# Patient Record
Sex: FEMALE | Race: OTHER | URBAN - METROPOLITAN AREA
[De-identification: names, ages, dates, MRNs, and addresses within clinical notes are randomized per-mention and may not be internally consistent; named-entity substitution may affect disease eponyms.]

---

## 2018-04-24 ENCOUNTER — INPATIENT (INPATIENT)
Facility: HOSPITAL | Age: 83
LOS: 7 days | Discharge: NURSING, REHAB OR LONG-TERM CARE | End: 2018-05-02
Attending: INTERNAL MEDICINE | Admitting: INTERNAL MEDICINE
Payer: MEDICARE

## 2018-04-24 VITALS
SYSTOLIC BLOOD PRESSURE: 107 MMHG | RESPIRATION RATE: 16 BRPM | DIASTOLIC BLOOD PRESSURE: 51 MMHG | OXYGEN SATURATION: 96 % | HEART RATE: 104 BPM | TEMPERATURE: 102 F

## 2018-04-24 DIAGNOSIS — A41.9 SEPSIS, UNSPECIFIED ORGANISM: ICD-10-CM

## 2018-04-24 LAB
ALBUMIN SERPL ELPH-MCNC: 3.5 G/DL — SIGNIFICANT CHANGE UP (ref 3.3–5)
ALP SERPL-CCNC: 61 U/L — SIGNIFICANT CHANGE UP (ref 40–120)
ALT FLD-CCNC: 18 U/L — SIGNIFICANT CHANGE UP (ref 4–33)
ANISOCYTOSIS BLD QL: SLIGHT — SIGNIFICANT CHANGE UP
AST SERPL-CCNC: 27 U/L — SIGNIFICANT CHANGE UP (ref 4–32)
B PERT DNA SPEC QL NAA+PROBE: SIGNIFICANT CHANGE UP
BASE EXCESS BLDV CALC-SCNC: -1.3 MMOL/L — SIGNIFICANT CHANGE UP
BASOPHILS # BLD AUTO: 0.05 K/UL — SIGNIFICANT CHANGE UP (ref 0–0.2)
BASOPHILS NFR BLD AUTO: 0.8 % — SIGNIFICANT CHANGE UP (ref 0–2)
BASOPHILS NFR SPEC: 0 % — SIGNIFICANT CHANGE UP (ref 0–2)
BILIRUB SERPL-MCNC: 0.4 MG/DL — SIGNIFICANT CHANGE UP (ref 0.2–1.2)
BLASTS # FLD: 0 % — SIGNIFICANT CHANGE UP (ref 0–0)
BLOOD GAS VENOUS - CREATININE: 1.53 MG/DL — HIGH (ref 0.5–1.3)
BUN SERPL-MCNC: 36 MG/DL — HIGH (ref 7–23)
C PNEUM DNA SPEC QL NAA+PROBE: NOT DETECTED — SIGNIFICANT CHANGE UP
CALCIUM SERPL-MCNC: 8.2 MG/DL — LOW (ref 8.4–10.5)
CHLORIDE BLDV-SCNC: 109 MMOL/L — HIGH (ref 96–108)
CHLORIDE SERPL-SCNC: 106 MMOL/L — SIGNIFICANT CHANGE UP (ref 98–107)
CO2 SERPL-SCNC: 21 MMOL/L — LOW (ref 22–31)
CREAT SERPL-MCNC: 1.59 MG/DL — HIGH (ref 0.5–1.3)
EOSINOPHIL # BLD AUTO: 0 K/UL — SIGNIFICANT CHANGE UP (ref 0–0.5)
EOSINOPHIL NFR BLD AUTO: 0 % — SIGNIFICANT CHANGE UP (ref 0–6)
EOSINOPHIL NFR FLD: 0 % — SIGNIFICANT CHANGE UP (ref 0–6)
FLUAV H1 2009 PAND RNA SPEC QL NAA+PROBE: NOT DETECTED — SIGNIFICANT CHANGE UP
FLUAV H1 RNA SPEC QL NAA+PROBE: NOT DETECTED — SIGNIFICANT CHANGE UP
FLUAV H3 RNA SPEC QL NAA+PROBE: NOT DETECTED — SIGNIFICANT CHANGE UP
FLUAV SUBTYP SPEC NAA+PROBE: SIGNIFICANT CHANGE UP
FLUBV RNA SPEC QL NAA+PROBE: NOT DETECTED — SIGNIFICANT CHANGE UP
GAS PNL BLDV: 139 MMOL/L — SIGNIFICANT CHANGE UP (ref 136–146)
GIANT PLATELETS BLD QL SMEAR: PRESENT — SIGNIFICANT CHANGE UP
GLUCOSE BLDV-MCNC: 121 — HIGH (ref 70–99)
GLUCOSE SERPL-MCNC: 127 MG/DL — HIGH (ref 70–99)
HADV DNA SPEC QL NAA+PROBE: NOT DETECTED — SIGNIFICANT CHANGE UP
HCO3 BLDV-SCNC: 23 MMOL/L — SIGNIFICANT CHANGE UP (ref 20–27)
HCOV 229E RNA SPEC QL NAA+PROBE: NOT DETECTED — SIGNIFICANT CHANGE UP
HCOV HKU1 RNA SPEC QL NAA+PROBE: NOT DETECTED — SIGNIFICANT CHANGE UP
HCOV NL63 RNA SPEC QL NAA+PROBE: NOT DETECTED — SIGNIFICANT CHANGE UP
HCOV OC43 RNA SPEC QL NAA+PROBE: NOT DETECTED — SIGNIFICANT CHANGE UP
HCT VFR BLD CALC: 28.2 % — LOW (ref 34.5–45)
HCT VFR BLDV CALC: 27.5 % — LOW (ref 34.5–45)
HGB BLD-MCNC: 8.8 G/DL — LOW (ref 11.5–15.5)
HGB BLDV-MCNC: 8.9 G/DL — LOW (ref 11.5–15.5)
HMPV RNA SPEC QL NAA+PROBE: POSITIVE — HIGH
HPIV1 RNA SPEC QL NAA+PROBE: NOT DETECTED — SIGNIFICANT CHANGE UP
HPIV2 RNA SPEC QL NAA+PROBE: NOT DETECTED — SIGNIFICANT CHANGE UP
HPIV3 RNA SPEC QL NAA+PROBE: NOT DETECTED — SIGNIFICANT CHANGE UP
HPIV4 RNA SPEC QL NAA+PROBE: NOT DETECTED — SIGNIFICANT CHANGE UP
HYPOCHROMIA BLD QL: SLIGHT — SIGNIFICANT CHANGE UP
IMM GRANULOCYTES # BLD AUTO: 0.13 # — SIGNIFICANT CHANGE UP
IMM GRANULOCYTES NFR BLD AUTO: 2.1 % — HIGH (ref 0–1.5)
LACTATE BLDV-MCNC: 1.2 MMOL/L — SIGNIFICANT CHANGE UP (ref 0.5–2)
LYMPHOCYTES # BLD AUTO: 0.45 K/UL — LOW (ref 1–3.3)
LYMPHOCYTES # BLD AUTO: 7.2 % — LOW (ref 13–44)
LYMPHOCYTES NFR SPEC AUTO: 1.7 % — LOW (ref 13–44)
M PNEUMO DNA SPEC QL NAA+PROBE: NOT DETECTED — SIGNIFICANT CHANGE UP
MACROCYTES BLD QL: SLIGHT — SIGNIFICANT CHANGE UP
MCHC RBC-ENTMCNC: 31.2 % — LOW (ref 32–36)
MCHC RBC-ENTMCNC: 32 PG — SIGNIFICANT CHANGE UP (ref 27–34)
MCV RBC AUTO: 102.5 FL — HIGH (ref 80–100)
METAMYELOCYTES # FLD: 0 % — SIGNIFICANT CHANGE UP (ref 0–1)
MONOCYTES # BLD AUTO: 0.18 K/UL — SIGNIFICANT CHANGE UP (ref 0–0.9)
MONOCYTES NFR BLD AUTO: 2.9 % — SIGNIFICANT CHANGE UP (ref 2–14)
MONOCYTES NFR BLD: 0.9 % — LOW (ref 2–9)
MYELOCYTES NFR BLD: 0 % — SIGNIFICANT CHANGE UP (ref 0–0)
NEUTROPHIL AB SER-ACNC: 89.4 % — HIGH (ref 43–77)
NEUTROPHILS # BLD AUTO: 5.41 K/UL — SIGNIFICANT CHANGE UP (ref 1.8–7.4)
NEUTROPHILS NFR BLD AUTO: 87 % — HIGH (ref 43–77)
NEUTS BAND # BLD: 6.2 % — HIGH (ref 0–6)
NRBC # FLD: 0 — SIGNIFICANT CHANGE UP
OTHER - HEMATOLOGY %: 0 — SIGNIFICANT CHANGE UP
OVALOCYTES BLD QL SMEAR: SLIGHT — SIGNIFICANT CHANGE UP
PCO2 BLDV: 37 MMHG — LOW (ref 41–51)
PH BLDV: 7.41 PH — SIGNIFICANT CHANGE UP (ref 7.32–7.43)
PLATELET # BLD AUTO: 100 K/UL — LOW (ref 150–400)
PLATELET COUNT - ESTIMATE: SIGNIFICANT CHANGE UP
PMV BLD: 11.7 FL — SIGNIFICANT CHANGE UP (ref 7–13)
PO2 BLDV: 39 MMHG — SIGNIFICANT CHANGE UP (ref 35–40)
POTASSIUM BLDV-SCNC: 3.9 MMOL/L — SIGNIFICANT CHANGE UP (ref 3.4–4.5)
POTASSIUM SERPL-MCNC: 4.2 MMOL/L — SIGNIFICANT CHANGE UP (ref 3.5–5.3)
POTASSIUM SERPL-SCNC: 4.2 MMOL/L — SIGNIFICANT CHANGE UP (ref 3.5–5.3)
PROMYELOCYTES # FLD: 0 % — SIGNIFICANT CHANGE UP (ref 0–0)
PROT SERPL-MCNC: 6.6 G/DL — SIGNIFICANT CHANGE UP (ref 6–8.3)
RBC # BLD: 2.75 M/UL — LOW (ref 3.8–5.2)
RBC # FLD: 15.9 % — HIGH (ref 10.3–14.5)
RSV RNA SPEC QL NAA+PROBE: NOT DETECTED — SIGNIFICANT CHANGE UP
RV+EV RNA SPEC QL NAA+PROBE: NOT DETECTED — SIGNIFICANT CHANGE UP
SAO2 % BLDV: 68.4 % — SIGNIFICANT CHANGE UP (ref 60–85)
SODIUM SERPL-SCNC: 142 MMOL/L — SIGNIFICANT CHANGE UP (ref 135–145)
VARIANT LYMPHS # BLD: 1.8 % — SIGNIFICANT CHANGE UP
WBC # BLD: 6.22 K/UL — SIGNIFICANT CHANGE UP (ref 3.8–10.5)
WBC # FLD AUTO: 6.22 K/UL — SIGNIFICANT CHANGE UP (ref 3.8–10.5)

## 2018-04-24 PROCEDURE — 71045 X-RAY EXAM CHEST 1 VIEW: CPT | Mod: 26

## 2018-04-24 RX ORDER — IPRATROPIUM/ALBUTEROL SULFATE 18-103MCG
3 AEROSOL WITH ADAPTER (GRAM) INHALATION ONCE
Qty: 0 | Refills: 0 | Status: COMPLETED | OUTPATIENT
Start: 2018-04-24 | End: 2018-04-24

## 2018-04-24 RX ORDER — VANCOMYCIN HCL 1 G
1000 VIAL (EA) INTRAVENOUS ONCE
Qty: 0 | Refills: 0 | Status: COMPLETED | OUTPATIENT
Start: 2018-04-24 | End: 2018-04-24

## 2018-04-24 RX ORDER — SODIUM CHLORIDE 9 MG/ML
1000 INJECTION INTRAMUSCULAR; INTRAVENOUS; SUBCUTANEOUS ONCE
Qty: 0 | Refills: 0 | Status: COMPLETED | OUTPATIENT
Start: 2018-04-24 | End: 2018-04-24

## 2018-04-24 RX ORDER — PIPERACILLIN AND TAZOBACTAM 4; .5 G/20ML; G/20ML
3.38 INJECTION, POWDER, LYOPHILIZED, FOR SOLUTION INTRAVENOUS ONCE
Qty: 0 | Refills: 0 | Status: COMPLETED | OUTPATIENT
Start: 2018-04-24 | End: 2018-04-24

## 2018-04-24 RX ORDER — SODIUM CHLORIDE 9 MG/ML
1000 INJECTION INTRAMUSCULAR; INTRAVENOUS; SUBCUTANEOUS
Qty: 0 | Refills: 0 | Status: DISCONTINUED | OUTPATIENT
Start: 2018-04-24 | End: 2018-04-25

## 2018-04-24 RX ADMIN — Medication 250 MILLIGRAM(S): at 20:59

## 2018-04-24 RX ADMIN — SODIUM CHLORIDE 150 MILLILITER(S): 9 INJECTION INTRAMUSCULAR; INTRAVENOUS; SUBCUTANEOUS at 21:57

## 2018-04-24 RX ADMIN — Medication 3 MILLILITER(S): at 21:03

## 2018-04-24 RX ADMIN — PIPERACILLIN AND TAZOBACTAM 200 GRAM(S): 4; .5 INJECTION, POWDER, LYOPHILIZED, FOR SOLUTION INTRAVENOUS at 19:52

## 2018-04-24 RX ADMIN — SODIUM CHLORIDE 2000 MILLILITER(S): 9 INJECTION INTRAMUSCULAR; INTRAVENOUS; SUBCUTANEOUS at 18:57

## 2018-04-24 NOTE — ED PROVIDER NOTE - ATTENDING CONTRIBUTION TO CARE
neel: pmh as noted. several days of increasing weakness, cough. Unable to tolerate po antibiotics that were prescribed and pt came to ED.  exam: febrile via axillary temperature. pt alert, conversant, cooperative. NAD. Bilateral rhonchi, rt>lt.  cxr and labs pending. antibiotics and fluids started for presumed sepsis/possible pneumonia.

## 2018-04-24 NOTE — ED PROVIDER NOTE - PMH
Breast Cancer  s/l s/l lumpectomy 2003 radiation  CAD (Coronary Artery Disease)  coronarary stent 1 2010  Constipation    Corneal foreign body  s/p corneal transplant right  High cholesterol    HTN (Hypertension)  2000  Lung nodule  right 2004  Osteoporosis

## 2018-04-24 NOTE — ED ADULT NURSE NOTE - OBJECTIVE STATEMENT
Received pt to spot 24 A&Ox1 on assessment family at the bedside reports pt is A&Ox4 @ baseline. As per family pt has been coughing x several days and has subjective fever @ home up to 102. Pt currently on IV chemo r/t recurrent breast Ca. Family noted to be poor historians. Pt denies CP or other subjective medical complaints, appears otherwise comfortable, mediport accessed, VS as documented, will continue to monitor.

## 2018-04-24 NOTE — ED PROVIDER NOTE - OBJECTIVE STATEMENT
88 y/o female pmh htn, CAD, lung CA currently on chemo, last dose x4 days ago c/o cough and fever x4 days. pt admits to productive cough with whitish sputum. Pt went to PMD x4 days ago who prescribed cough suppressant and losengers. Pt then developed worsening cough and sob and weakness. Pt had fever today and went to PMD again who gave pt IV fluids in office and prescribed abx. As per son, pt could not swallow abx and was having fever and rigors at home, which warranted ER visit. Pt also admits to 1 episode of emesis x1 day ago, nbnb. Pt denies chest pain, abd pain, diarrhea, dysuria, frequency, numbness, tingling, dizziness or syncope. 88 y/o female pmh htn, CAD, lung CA currently on chemo, last dose x4 days ago c/o cough and fever x4 days. pt admits to productive cough with whitish sputum. Pt went to PMD x4 days ago who prescribed cough suppressant and lozengers. Pt then developed worsening cough and sob and weakness. Pt had fever today and went to PMD again who gave pt IV fluids in office and prescribed abx. As per son, pt could not swallow abx and was having fever and rigors at home, which warranted ER visit. Pt also admits to 1 episode of emesis x1 day ago, nbnb. Pt denies chest pain, abd pain, diarrhea, dysuria, frequency, numbness, tingling, dizziness or syncope.

## 2018-04-24 NOTE — ED ADULT TRIAGE NOTE - CHIEF COMPLAINT QUOTE
Pt brought in by EMS from home complaining of weakness, was seen by her PMD this AM and given antibiotics and sent home. As per EMS pt felt worse when she got home and called 911.

## 2018-04-25 DIAGNOSIS — I25.10 ATHEROSCLEROTIC HEART DISEASE OF NATIVE CORONARY ARTERY WITHOUT ANGINA PECTORIS: ICD-10-CM

## 2018-04-25 DIAGNOSIS — A41.9 SEPSIS, UNSPECIFIED ORGANISM: ICD-10-CM

## 2018-04-25 DIAGNOSIS — E78.00 PURE HYPERCHOLESTEROLEMIA, UNSPECIFIED: ICD-10-CM

## 2018-04-25 DIAGNOSIS — I10 ESSENTIAL (PRIMARY) HYPERTENSION: ICD-10-CM

## 2018-04-25 DIAGNOSIS — Z29.9 ENCOUNTER FOR PROPHYLACTIC MEASURES, UNSPECIFIED: ICD-10-CM

## 2018-04-25 DIAGNOSIS — R91.1 SOLITARY PULMONARY NODULE: ICD-10-CM

## 2018-04-25 LAB
BASOPHILS # BLD AUTO: 0.06 K/UL — SIGNIFICANT CHANGE UP (ref 0–0.2)
BASOPHILS NFR BLD AUTO: 0.8 % — SIGNIFICANT CHANGE UP (ref 0–2)
BUN SERPL-MCNC: 28 MG/DL — HIGH (ref 7–23)
CALCIUM SERPL-MCNC: 7.4 MG/DL — LOW (ref 8.4–10.5)
CHLORIDE SERPL-SCNC: 107 MMOL/L — SIGNIFICANT CHANGE UP (ref 98–107)
CO2 SERPL-SCNC: 17 MMOL/L — LOW (ref 22–31)
CREAT SERPL-MCNC: 1.6 MG/DL — HIGH (ref 0.5–1.3)
EOSINOPHIL # BLD AUTO: 0 K/UL — SIGNIFICANT CHANGE UP (ref 0–0.5)
EOSINOPHIL NFR BLD AUTO: 0 % — SIGNIFICANT CHANGE UP (ref 0–6)
GLUCOSE SERPL-MCNC: 124 MG/DL — HIGH (ref 70–99)
HCT VFR BLD CALC: 29 % — LOW (ref 34.5–45)
HGB BLD-MCNC: 8.4 G/DL — LOW (ref 11.5–15.5)
IMM GRANULOCYTES # BLD AUTO: 0.05 # — SIGNIFICANT CHANGE UP
IMM GRANULOCYTES NFR BLD AUTO: 0.6 % — SIGNIFICANT CHANGE UP (ref 0–1.5)
LYMPHOCYTES # BLD AUTO: 1.21 K/UL — SIGNIFICANT CHANGE UP (ref 1–3.3)
LYMPHOCYTES # BLD AUTO: 15.3 % — SIGNIFICANT CHANGE UP (ref 13–44)
MAGNESIUM SERPL-MCNC: 2.1 MG/DL — SIGNIFICANT CHANGE UP (ref 1.6–2.6)
MANUAL SMEAR VERIFICATION: SIGNIFICANT CHANGE UP
MCHC RBC-ENTMCNC: 29 % — LOW (ref 32–36)
MCHC RBC-ENTMCNC: 31.8 PG — SIGNIFICANT CHANGE UP (ref 27–34)
MCV RBC AUTO: 109.8 FL — HIGH (ref 80–100)
MONOCYTES # BLD AUTO: 0.14 K/UL — SIGNIFICANT CHANGE UP (ref 0–0.9)
MONOCYTES NFR BLD AUTO: 1.8 % — LOW (ref 2–14)
NEUTROPHILS # BLD AUTO: 6.43 K/UL — SIGNIFICANT CHANGE UP (ref 1.8–7.4)
NEUTROPHILS NFR BLD AUTO: 81.5 % — HIGH (ref 43–77)
NRBC # FLD: 0 — SIGNIFICANT CHANGE UP
PHOSPHATE SERPL-MCNC: 3.5 MG/DL — SIGNIFICANT CHANGE UP (ref 2.5–4.5)
PLATELET # BLD AUTO: 71 K/UL — LOW (ref 150–400)
PMV BLD: 12.1 FL — SIGNIFICANT CHANGE UP (ref 7–13)
POTASSIUM SERPL-MCNC: 3.8 MMOL/L — SIGNIFICANT CHANGE UP (ref 3.5–5.3)
POTASSIUM SERPL-SCNC: 3.8 MMOL/L — SIGNIFICANT CHANGE UP (ref 3.5–5.3)
RBC # BLD: 2.64 M/UL — LOW (ref 3.8–5.2)
RBC # FLD: 16.2 % — HIGH (ref 10.3–14.5)
SODIUM SERPL-SCNC: 140 MMOL/L — SIGNIFICANT CHANGE UP (ref 135–145)
SPECIMEN SOURCE: SIGNIFICANT CHANGE UP
SPECIMEN SOURCE: SIGNIFICANT CHANGE UP
WBC # BLD: 7.89 K/UL — SIGNIFICANT CHANGE UP (ref 3.8–10.5)
WBC # FLD AUTO: 7.89 K/UL — SIGNIFICANT CHANGE UP (ref 3.8–10.5)

## 2018-04-25 PROCEDURE — 99223 1ST HOSP IP/OBS HIGH 75: CPT

## 2018-04-25 RX ORDER — ACETAMINOPHEN 500 MG
650 TABLET ORAL EVERY 6 HOURS
Qty: 0 | Refills: 0 | Status: DISCONTINUED | OUTPATIENT
Start: 2018-04-25 | End: 2018-05-02

## 2018-04-25 RX ORDER — IPRATROPIUM/ALBUTEROL SULFATE 18-103MCG
3 AEROSOL WITH ADAPTER (GRAM) INHALATION EVERY 6 HOURS
Qty: 0 | Refills: 0 | Status: DISCONTINUED | OUTPATIENT
Start: 2018-04-25 | End: 2018-05-02

## 2018-04-25 RX ORDER — LOSARTAN POTASSIUM 100 MG/1
50 TABLET, FILM COATED ORAL DAILY
Qty: 0 | Refills: 0 | Status: DISCONTINUED | OUTPATIENT
Start: 2018-04-25 | End: 2018-04-26

## 2018-04-25 RX ORDER — ACETAMINOPHEN 500 MG
1000 TABLET ORAL ONCE
Qty: 0 | Refills: 0 | Status: COMPLETED | OUTPATIENT
Start: 2018-04-25 | End: 2018-04-25

## 2018-04-25 RX ORDER — METOPROLOL TARTRATE 50 MG
50 TABLET ORAL DAILY
Qty: 0 | Refills: 0 | Status: DISCONTINUED | OUTPATIENT
Start: 2018-04-25 | End: 2018-05-02

## 2018-04-25 RX ORDER — HEPARIN SODIUM 5000 [USP'U]/ML
5000 INJECTION INTRAVENOUS; SUBCUTANEOUS EVERY 8 HOURS
Qty: 0 | Refills: 0 | Status: DISCONTINUED | OUTPATIENT
Start: 2018-04-25 | End: 2018-04-25

## 2018-04-25 RX ORDER — ATORVASTATIN CALCIUM 80 MG/1
20 TABLET, FILM COATED ORAL AT BEDTIME
Qty: 0 | Refills: 0 | Status: DISCONTINUED | OUTPATIENT
Start: 2018-04-25 | End: 2018-05-02

## 2018-04-25 RX ORDER — SODIUM CHLORIDE 9 MG/ML
1000 INJECTION INTRAMUSCULAR; INTRAVENOUS; SUBCUTANEOUS
Qty: 0 | Refills: 0 | Status: DISCONTINUED | OUTPATIENT
Start: 2018-04-25 | End: 2018-04-26

## 2018-04-25 RX ORDER — AMLODIPINE BESYLATE 2.5 MG/1
5 TABLET ORAL DAILY
Qty: 0 | Refills: 0 | Status: DISCONTINUED | OUTPATIENT
Start: 2018-04-25 | End: 2018-04-28

## 2018-04-25 RX ORDER — BUDESONIDE, MICRONIZED 100 %
0.5 POWDER (GRAM) MISCELLANEOUS
Qty: 0 | Refills: 0 | Status: DISCONTINUED | OUTPATIENT
Start: 2018-04-25 | End: 2018-05-02

## 2018-04-25 RX ADMIN — Medication 3 MILLILITER(S): at 09:14

## 2018-04-25 RX ADMIN — Medication 3 MILLILITER(S): at 15:34

## 2018-04-25 RX ADMIN — HEPARIN SODIUM 5000 UNIT(S): 5000 INJECTION INTRAVENOUS; SUBCUTANEOUS at 05:26

## 2018-04-25 RX ADMIN — Medication 20 MILLIGRAM(S): at 22:17

## 2018-04-25 RX ADMIN — Medication 400 MILLIGRAM(S): at 01:44

## 2018-04-25 RX ADMIN — SODIUM CHLORIDE 75 MILLILITER(S): 9 INJECTION INTRAMUSCULAR; INTRAVENOUS; SUBCUTANEOUS at 03:54

## 2018-04-25 RX ADMIN — Medication 20 MILLIGRAM(S): at 13:25

## 2018-04-25 RX ADMIN — Medication 3 MILLILITER(S): at 22:07

## 2018-04-25 RX ADMIN — Medication 650 MILLIGRAM(S): at 21:44

## 2018-04-25 RX ADMIN — ATORVASTATIN CALCIUM 20 MILLIGRAM(S): 80 TABLET, FILM COATED ORAL at 22:17

## 2018-04-25 RX ADMIN — Medication 0.5 MILLIGRAM(S): at 21:57

## 2018-04-25 RX ADMIN — Medication 3 MILLILITER(S): at 04:29

## 2018-04-25 NOTE — H&P ADULT - HISTORY OF PRESENT ILLNESS
89 y.o. woman with history of essential hypertension, CAD, lung cancer and chemotherapy who was brought tot he ER for evaluation of worsening cough with generalized weakness of several days in duration. Patient is a poor historian, therefore, all pertinent information is obtained from chart review. As per ER records, patient was seen and evaluated by PMD on several occasions and was prescribed cough suppressants and PO lozenges without improvements. Patient was also given PO abx, however, she was unable to tolerate the antibiotics secondary to emesis. Patient was sent to the ER for further treatment was given vanc/zosyn in the ER. RVP showed HMPV and patient was admitted to the medicine service for further care. Presently, patient reports mild mid back pain. No other complaints.

## 2018-04-25 NOTE — H&P ADULT - PSH
Cataracts, bilateral    Coronary Stent  x 1 2000  S/P Breast Lumpectomy  L breast  S/P hysterectomy  1997

## 2018-04-25 NOTE — H&P ADULT - GASTROINTESTINAL DETAILS
no bruit/no guarding/no distention/soft/nontender/no masses palpable/no rebound tenderness/no organomegaly/no rigidity/bowel sounds normal

## 2018-04-25 NOTE — CONSULT NOTE ADULT - SUBJECTIVE AND OBJECTIVE BOX
[facility], Division of Infectious Diseases  CHRISTOPHER Mckinley A. Lee    TENA, VERONICA  89y, Female  [id number]    HPI--  History from chart, patient is confused and does not respond to questions/commands consistently. 89F with metastatic cancer on chemotherapy, variously described in the chart as either lung cancer or breast cancer (medianstinoscopy  with +LN, ER+; other biopsies prior bx with ?neuroendocrine features) on chemotherapy; Breast cancer s/p lumpectomy in , CAD/stent in , syncope, C. difficile colitis, dyslipidemia, hysterectomy, osteoporosis, and hypertension. She presents to ER with cough, congestion and failed PO antibiotics as outpatient. Here she's febrile >102F, tachycardic to 104bpm in ER. She was initially given broad spectrum antibiotics with vancomycina nd pip-tazo but these were not continued after RVP+ for HMPV. She's been afebrile since, off antibiotics.     PMH/PSH--  Osteoporosis  Constipation  Lung nodule  Corneal foreign body  High cholesterol  CAD (Coronary Artery Disease)  Breast Cancer  HTN (Hypertension)  S/P hysterectomy  Cataracts, bilateral  S/P Breast Lumpectomy  Coronary Stent      Allergies-- NKDA per chart      Medications--  Antibiotics:   Immunologic:   Other: acetaminophen   Tablet PRN  ALBUTerol/ipratropium for Nebulization  amLODIPine   Tablet  atorvastatin  buDESOnide   0.5 milliGRAM(s) Respule  losartan  methylPREDNISolone sodium succinate Injectable  metoprolol succinate ER  sodium chloride 0.9%.      Social History--  EtOH: none reported  Tobacco: none reported  Drug Use: none reported    Family/Marital History--  .  Father  from CHF  Mother  in her 90's of old age  No pertinent family history in first degree relatives  Family history of heart disease  Family history of cancer  Remainder unknown     Travel/Environmental/Occupational History:  unable    Review of Systems:  Review of systems unable secondary to clinical condition.     Physical Exam--  Vital Signs: T(F): 98.8 (18 @ 05:20), Max: 102.3 (18 @ 00:21)  HR: 90 (18 @ 09:14)  BP: 103/47 (18 @ 05:20)  RR: 16 (18 @ 05:20)  SpO2: 96% (18 @ 09:14)  Wt(kg): --  General: Nontoxic-appearing Female in no acute distress.  HEENT: AT/NC. Pupils/EOM unable secondary to patient compliance. Oropharynx/dentition unable secondary to patient compliance. Corneal opacity L eye, hx corneal xplant with foreign body  Neck: Not rigid. No sense of mass.  Chest: port R ACW C/D/I  Nodes: None palpable.  Lungs: wheezing/rhonchi bilaterally   Heart: Partially obscured. Grossly regular rate and rhythm. No Murmur. No rub. No gallop. No palpable thrill.  Abdomen: Bowel sounds present and normoactive. Soft. Nondistended. Nontender. No sense of mass. No organomegaly.  Back: No spinal tenderness. No costovertebral angle tenderness.   Extremities: No cyanosis or clubbing. No edema.   Skin: Warm. Dry. Good turgor. No rash. No vasculitic stigmata.  Neuro: Appears to be moving all extremities, no focal defecit. Unable to assess sensory exam.   Psychiatric: Confused. Does not respond to commands/questions consistently or appropriately.       Laboratory & Imaging Data--  CBC                        8.4    7.89  )-----------( 71       ( 2018 06:45 )             29.0   WBC Count: 6.22 K/uL (18 @ 18:15)    No coags    Chemistries      140  |  107  |  28<H>  ----------------------------<  124<H>  3.8   |  17<L>  |  1.60<H>  Creatinine, Serum: 1.59 mg/dL (18 @ 18:15)  Creatinine, Serum: 1.55 mg/dL (08.11.15 @ 10:19)      Ca    7.4<L>      2018 06:45  Phos  3.5       Mg     2.1         TPro  6.6  /  Alb  3.5  /  TBili  0.4  /  DBili  x   /  AST  27  /  ALT  18  /  AlkPhos  61      Rapid Respiratory Viral Panel (18 @ 19:00)    Adenovirus (RapRVP): NOT DETECTED    Influenza A (RapRVP): NOT DETECTED (any subtype)    Influenza AH1 2009 (RapRVP): NOT DETECTED    Influenza AH1 (RapRVP): NOT DETECTED    Influenza AH3 (RapRVP): NOT DETECTED    Influenza B (RapRVP): NOT DETECTED    Parainfluenza 1 (RapRVP): NOT DETECTED    Parainfluenza 2 (RapRVP): NOT DETECTED    Parainfluenza 3 (RapRVP): NOT DETECTED    Parainfluenza 4 (RapRVP): NOT DETECTED    Resp Syncytial Virus (RapRVP): NOT DETECTED    Bordetella pertussis (RapRVP): NOT DETECTED    Chlamydia pneumoniae (RapRVP): NOT DETECTED    Mycoplasma pneumoniae (RapRVP): NOT DETECTED     Entero/Rhinovirus (RapRVP): NOT DETECTED    HKU1 Coronavirus (RapRVP): NOT DETECTED    NL63 Coronavirus (RapRVP): NOT DETECTED    229E Coronavirus (RapRVP): NOT DETECTED    OC43 Coronavirus (RapRVP): NOT DETECTED    hMPV (RapRVP): POSITIVE      < from: Xray Chest 1 View AP/PA (18 @ 20:31) >    IMPRESSION:   No acute pulmonary disease.    < end of copied text >      Culture Data  None as of yet

## 2018-04-25 NOTE — H&P ADULT - PROBLEM SELECTOR PLAN 1
- secondary to hMPV  - Symptomatic treatment with inhaled steroids, duonebs, and supplemental O2  - Monitor off of abx for now

## 2018-04-25 NOTE — CHART NOTE - NSCHARTNOTEFT_GEN_A_CORE
Pt seen and examined at bedside. Please refer to the H&P done today for details.    no cp, no sob, no n/v/d. no abdominal pain.  no headache, no dizziness.   + expiratory wheeze bilaterally.   The son at bedside.     Still wheezing quite a bit. +cough. Fever 101F at home.  +hMPV  Will start Solumedrol 20 mg Q8hrs. c/w Nebs. O2 nasal canula PRN.   Monitor off abx. Pending culture.  ID Eval.   Cr is somewhat baseline.  Anemic, likely chemo induced. Monitor Hgb.   Check iron studies, Vit B12. Pt denied melena, hematochezia.     d/w the pt and the son at bedside.     - Dr. SRINIVAS Cardona ([facility])  - [phone]

## 2018-04-25 NOTE — CONSULT NOTE ADULT - ASSESSMENT
89F with probable metastatic breast cancer on unknown chemotherapy regimen. Not neutropenic. Anemic and thrombocytopenic. Doubt latter on the basis of sepsis, suspect chemo related. Drop may be dilutional.  Here with Bronchitis due to HMPV  Also with delirium. Doubt primary CNS infection causing symptoms. Mental status/speech more consistent with delirium more than dysarthria due CVA. Question whether 60mg/day of solumedrol might be negatively impacting her mental status. She is thrombocytopenic but no focal deficit observed on exam.  No concern of skin/soft tissue infection  No known urinary symptoms, but no urine studies appear to have been sent. Moreover available hx and chest exam fits with dx of HMPV  Abdominal exam benign  Port site benign, blood cx pending but given alternative source for presentation I do not feel obligated to give antibiotics vs. this possibility presently.  With her having a history of C. difficile, I'd have a high threshold to begin empiric antibiotics here.    Suggestions--  Contact precautions  Supportive care for HMPV. She does not appear uncomfortable presently. On steroids/nebs, decrease steroids as able  Delirum as per primary service.   Contact precautions per protocol.  Will review with Dr. Cardona.    Thank you for the courtesy of this referral.  Osmel Meadows MD  [phone]

## 2018-04-25 NOTE — H&P ADULT - ASSESSMENT
89 y.o. woman with essential hypertension, CAD, lung cancer on chemotherapy now cough, fever, and malaise likely secondary to hMPV infection.

## 2018-04-25 NOTE — PHYSICAL THERAPY INITIAL EVALUATION ADULT - ADDITIONAL COMMENTS
Patient reports she lives with her daughter in a home with steps to enter; patient did not use an Assistive Device prior to admission

## 2018-04-25 NOTE — PHYSICAL THERAPY INITIAL EVALUATION ADULT - PERTINENT HX OF CURRENT PROBLEM, REHAB EVAL
89 y.o. woman with history of essential hypertension, CAD, lung cancer and chemotherapy who was brought to the ER for evaluation of worsening cough with generalized weakness of several days in duration.

## 2018-04-25 NOTE — H&P ADULT - NSHPPHYSICALEXAM_GEN_ALL_CORE
Vital Signs Last 24 Hrs  T(C): 37.8 (25 Apr 2018 02:47), Max: 39.1 (25 Apr 2018 00:21)  T(F): 100 (25 Apr 2018 02:47), Max: 102.3 (25 Apr 2018 00:21)  HR: 98 (25 Apr 2018 02:47) (91 - 107)  BP: 99/48 (25 Apr 2018 02:47) (99/48 - 124/41)  BP(mean): --  RR: 16 (25 Apr 2018 02:47) (16 - 18)  SpO2: 98% (25 Apr 2018 02:47) (96% - 100%)

## 2018-04-26 DIAGNOSIS — C50.919 MALIGNANT NEOPLASM OF UNSPECIFIED SITE OF UNSPECIFIED FEMALE BREAST: ICD-10-CM

## 2018-04-26 DIAGNOSIS — N17.9 ACUTE KIDNEY FAILURE, UNSPECIFIED: ICD-10-CM

## 2018-04-26 DIAGNOSIS — D50.8 OTHER IRON DEFICIENCY ANEMIAS: ICD-10-CM

## 2018-04-26 DIAGNOSIS — J40 BRONCHITIS, NOT SPECIFIED AS ACUTE OR CHRONIC: ICD-10-CM

## 2018-04-26 DIAGNOSIS — B97.81 HUMAN METAPNEUMOVIRUS AS THE CAUSE OF DISEASES CLASSIFIED ELSEWHERE: ICD-10-CM

## 2018-04-26 LAB
APPEARANCE UR: SIGNIFICANT CHANGE UP
BILIRUB UR-MCNC: NEGATIVE — SIGNIFICANT CHANGE UP
BLOOD UR QL VISUAL: NEGATIVE — SIGNIFICANT CHANGE UP
BUN SERPL-MCNC: 29 MG/DL — HIGH (ref 7–23)
CALCIUM SERPL-MCNC: 8.2 MG/DL — LOW (ref 8.4–10.5)
CHLORIDE SERPL-SCNC: 110 MMOL/L — HIGH (ref 98–107)
CO2 SERPL-SCNC: 16 MMOL/L — LOW (ref 22–31)
COLOR SPEC: YELLOW — SIGNIFICANT CHANGE UP
CREAT SERPL-MCNC: 2.07 MG/DL — HIGH (ref 0.5–1.3)
FERRITIN SERPL-MCNC: 792.6 NG/ML — HIGH (ref 15–150)
GLUCOSE SERPL-MCNC: 190 MG/DL — HIGH (ref 70–99)
GLUCOSE UR-MCNC: NEGATIVE — SIGNIFICANT CHANGE UP
HCT VFR BLD CALC: 29.1 % — LOW (ref 34.5–45)
HGB BLD-MCNC: 8.9 G/DL — LOW (ref 11.5–15.5)
IRON SATN MFR SERPL: 12 UG/DL — LOW (ref 30–160)
IRON SATN MFR SERPL: 178 UG/DL — SIGNIFICANT CHANGE UP (ref 140–530)
KETONES UR-MCNC: NEGATIVE — SIGNIFICANT CHANGE UP
LEUKOCYTE ESTERASE UR-ACNC: NEGATIVE — SIGNIFICANT CHANGE UP
MAGNESIUM SERPL-MCNC: 2.2 MG/DL — SIGNIFICANT CHANGE UP (ref 1.6–2.6)
MCHC RBC-ENTMCNC: 30.6 % — LOW (ref 32–36)
MCHC RBC-ENTMCNC: 32.4 PG — SIGNIFICANT CHANGE UP (ref 27–34)
MCV RBC AUTO: 105.8 FL — HIGH (ref 80–100)
MUCOUS THREADS # UR AUTO: SIGNIFICANT CHANGE UP
NITRITE UR-MCNC: NEGATIVE — SIGNIFICANT CHANGE UP
NRBC # FLD: 0 — SIGNIFICANT CHANGE UP
PH UR: 6 — SIGNIFICANT CHANGE UP (ref 4.6–8)
PLATELET # BLD AUTO: 82 K/UL — LOW (ref 150–400)
PMV BLD: 11.7 FL — SIGNIFICANT CHANGE UP (ref 7–13)
POTASSIUM SERPL-MCNC: 3.7 MMOL/L — SIGNIFICANT CHANGE UP (ref 3.5–5.3)
POTASSIUM SERPL-SCNC: 3.7 MMOL/L — SIGNIFICANT CHANGE UP (ref 3.5–5.3)
PROT UR-MCNC: 100 MG/DL — HIGH
RBC # BLD: 2.75 M/UL — LOW (ref 3.8–5.2)
RBC # FLD: 16.5 % — HIGH (ref 10.3–14.5)
RBC CASTS # UR COMP ASSIST: SIGNIFICANT CHANGE UP (ref 0–?)
SODIUM SERPL-SCNC: 145 MMOL/L — SIGNIFICANT CHANGE UP (ref 135–145)
SP GR SPEC: 1.01 — SIGNIFICANT CHANGE UP (ref 1–1.04)
SQUAMOUS # UR AUTO: SIGNIFICANT CHANGE UP
UIBC SERPL-MCNC: 166.3 UG/DL — SIGNIFICANT CHANGE UP (ref 110–370)
UROBILINOGEN FLD QL: NORMAL MG/DL — SIGNIFICANT CHANGE UP
VIT B12 SERPL-MCNC: 1128 PG/ML — HIGH (ref 200–900)
WBC # BLD: 10.37 K/UL — SIGNIFICANT CHANGE UP (ref 3.8–10.5)
WBC # FLD AUTO: 10.37 K/UL — SIGNIFICANT CHANGE UP (ref 3.8–10.5)
WBC UR QL: SIGNIFICANT CHANGE UP (ref 0–?)

## 2018-04-26 PROCEDURE — 70450 CT HEAD/BRAIN W/O DYE: CPT | Mod: 26

## 2018-04-26 RX ORDER — SODIUM CHLORIDE 9 MG/ML
1000 INJECTION, SOLUTION INTRAVENOUS
Qty: 0 | Refills: 0 | Status: DISCONTINUED | OUTPATIENT
Start: 2018-04-26 | End: 2018-04-27

## 2018-04-26 RX ORDER — SODIUM CHLORIDE 9 MG/ML
1000 INJECTION INTRAMUSCULAR; INTRAVENOUS; SUBCUTANEOUS
Qty: 0 | Refills: 0 | Status: DISCONTINUED | OUTPATIENT
Start: 2018-04-26 | End: 2018-04-26

## 2018-04-26 RX ADMIN — Medication 20 MILLIGRAM(S): at 14:25

## 2018-04-26 RX ADMIN — Medication 20 MILLIGRAM(S): at 05:36

## 2018-04-26 RX ADMIN — Medication 3 MILLILITER(S): at 04:27

## 2018-04-26 RX ADMIN — ATORVASTATIN CALCIUM 20 MILLIGRAM(S): 80 TABLET, FILM COATED ORAL at 21:46

## 2018-04-26 RX ADMIN — Medication 0.5 MILLIGRAM(S): at 20:16

## 2018-04-26 RX ADMIN — Medication 3 MILLILITER(S): at 11:35

## 2018-04-26 RX ADMIN — Medication 3 MILLILITER(S): at 20:24

## 2018-04-26 RX ADMIN — SODIUM CHLORIDE 75 MILLILITER(S): 9 INJECTION, SOLUTION INTRAVENOUS at 22:49

## 2018-04-26 RX ADMIN — Medication 650 MILLIGRAM(S): at 22:49

## 2018-04-26 RX ADMIN — SODIUM CHLORIDE 75 MILLILITER(S): 9 INJECTION INTRAMUSCULAR; INTRAVENOUS; SUBCUTANEOUS at 11:19

## 2018-04-26 RX ADMIN — Medication 20 MILLIGRAM(S): at 21:46

## 2018-04-26 RX ADMIN — Medication 3 MILLILITER(S): at 16:19

## 2018-04-26 RX ADMIN — Medication 0.5 MILLIGRAM(S): at 11:36

## 2018-04-26 NOTE — PROGRESS NOTE ADULT - PROBLEM SELECTOR PLAN 1
- secondary to hMPV  - Symptomatic treatment with inhaled steroids, duonebs, and supplemental O2  - Monitor off of abx for now  - started on IV Solumedrol with improved wheezing. - secondary to hMPV  - Symptomatic treatment with inhaled steroids, duonebs, and supplemental O2  - Monitor off of abx for now  - started on IV Solumedrol with improved wheezing.  - CT chest pending. Pulm Dr. Franz consulted. - secondary to hMPV  - Symptomatic treatment with inhaled steroids, duonebs, and supplemental O2  - Monitor off of abx for now  - started on IV Solumedrol with improved wheezing.  - CT chest pending. Pulm Dr. Franz consulted.  - (Metabolic encephalopathy due to sepsis from HMPV). CT head is neg.

## 2018-04-27 DIAGNOSIS — N17.9 ACUTE KIDNEY FAILURE, UNSPECIFIED: ICD-10-CM

## 2018-04-27 DIAGNOSIS — D72.829 ELEVATED WHITE BLOOD CELL COUNT, UNSPECIFIED: ICD-10-CM

## 2018-04-27 LAB
BUN SERPL-MCNC: 39 MG/DL — HIGH (ref 7–23)
CALCIUM SERPL-MCNC: 8.5 MG/DL — SIGNIFICANT CHANGE UP (ref 8.4–10.5)
CHLORIDE SERPL-SCNC: 110 MMOL/L — HIGH (ref 98–107)
CO2 SERPL-SCNC: 19 MMOL/L — LOW (ref 22–31)
CREAT SERPL-MCNC: 2.31 MG/DL — HIGH (ref 0.5–1.3)
GLUCOSE SERPL-MCNC: 187 MG/DL — HIGH (ref 70–99)
HCT VFR BLD CALC: 27.7 % — LOW (ref 34.5–45)
HGB BLD-MCNC: 8.5 G/DL — LOW (ref 11.5–15.5)
LACTATE SERPL-SCNC: 1.4 MMOL/L — SIGNIFICANT CHANGE UP (ref 0.5–2)
MCHC RBC-ENTMCNC: 30.7 % — LOW (ref 32–36)
MCHC RBC-ENTMCNC: 31.6 PG — SIGNIFICANT CHANGE UP (ref 27–34)
MCV RBC AUTO: 103 FL — HIGH (ref 80–100)
NRBC # FLD: 0 — SIGNIFICANT CHANGE UP
PLATELET # BLD AUTO: 104 K/UL — LOW (ref 150–400)
PMV BLD: 12 FL — SIGNIFICANT CHANGE UP (ref 7–13)
POTASSIUM SERPL-MCNC: 3 MMOL/L — LOW (ref 3.5–5.3)
POTASSIUM SERPL-SCNC: 3 MMOL/L — LOW (ref 3.5–5.3)
RBC # BLD: 2.69 M/UL — LOW (ref 3.8–5.2)
RBC # FLD: 16.6 % — HIGH (ref 10.3–14.5)
SODIUM SERPL-SCNC: 146 MMOL/L — HIGH (ref 135–145)
SPECIMEN SOURCE: SIGNIFICANT CHANGE UP
SPECIMEN SOURCE: SIGNIFICANT CHANGE UP
WBC # BLD: 16.56 K/UL — HIGH (ref 3.8–10.5)
WBC # FLD AUTO: 16.56 K/UL — HIGH (ref 3.8–10.5)

## 2018-04-27 PROCEDURE — 71250 CT THORAX DX C-: CPT | Mod: 26

## 2018-04-27 PROCEDURE — 76775 US EXAM ABDO BACK WALL LIM: CPT | Mod: 26

## 2018-04-27 RX ORDER — POTASSIUM CHLORIDE 20 MEQ
20 PACKET (EA) ORAL
Qty: 0 | Refills: 0 | Status: COMPLETED | OUTPATIENT
Start: 2018-04-27 | End: 2018-04-27

## 2018-04-27 RX ADMIN — Medication 20 MILLIGRAM(S): at 05:28

## 2018-04-27 RX ADMIN — AMLODIPINE BESYLATE 5 MILLIGRAM(S): 2.5 TABLET ORAL at 08:25

## 2018-04-27 RX ADMIN — Medication 3 MILLILITER(S): at 22:42

## 2018-04-27 RX ADMIN — Medication 3 MILLILITER(S): at 16:30

## 2018-04-27 RX ADMIN — Medication 20 MILLIEQUIVALENT(S): at 13:15

## 2018-04-27 RX ADMIN — Medication 3 MILLILITER(S): at 11:16

## 2018-04-27 RX ADMIN — Medication 0.5 MILLIGRAM(S): at 11:16

## 2018-04-27 RX ADMIN — Medication 20 MILLIEQUIVALENT(S): at 10:55

## 2018-04-27 RX ADMIN — SODIUM CHLORIDE 75 MILLILITER(S): 9 INJECTION, SOLUTION INTRAVENOUS at 08:25

## 2018-04-27 RX ADMIN — Medication 20 MILLIGRAM(S): at 18:03

## 2018-04-27 RX ADMIN — Medication 50 MILLIGRAM(S): at 08:25

## 2018-04-27 RX ADMIN — Medication 20 MILLIEQUIVALENT(S): at 11:42

## 2018-04-27 RX ADMIN — Medication 0.5 MILLIGRAM(S): at 22:42

## 2018-04-27 RX ADMIN — Medication 3 MILLILITER(S): at 03:04

## 2018-04-27 RX ADMIN — ATORVASTATIN CALCIUM 20 MILLIGRAM(S): 80 TABLET, FILM COATED ORAL at 21:44

## 2018-04-27 NOTE — CONSULT NOTE ADULT - PROBLEM SELECTOR RECOMMENDATION 9
Rising cr, with unclear baseline renal function.   Renal US without hydronephrosis, with pt with urinary retention.  Straight cathed with 700ml urine drained.   Continue to recheck bladder US later today to reeval urinary retention. If urine volume remains high, pt may need alexander cath.  Pt previously on IVF, without much improvement in Cr.  Ok to monitor off IVF for now.   Stable hemodynamics and no identifiable nephrotoxins.   Check daily BMP, avoid ACE/ARB,NSAID,contrast

## 2018-04-27 NOTE — CONSULT NOTE ADULT - PROBLEM SELECTOR RECOMMENDATION 2
chest x-ray : nodule not seen on it ? hsx oflung cacner: getting chemo: will folow with oncology after dc

## 2018-04-27 NOTE — CONSULT NOTE ADULT - ASSESSMENT
89y Female with HTN, CAD s/p PCI, breast cancer on chemo a/e SOB and cough, found to have +RVP, managed with methylprednisolone/Nebs and off abx. Renal consult for POOJA.

## 2018-04-27 NOTE — CONSULT NOTE ADULT - SUBJECTIVE AND OBJECTIVE BOX
Patient is a 89y old  Female who presents with a chief complaint of cough and febrile episode of several days in duration (2018 02:57)      HPI:  89 y.o. woman with history of essential hypertension, CAD, lung cancer and chemotherapy who was brought tot he ER for evaluation of worsening cough with generalized weakness of several days in duration. Patient is a poor historian, therefore, all pertinent information is obtained from chart review. As per ER records, patient was seen and evaluated by PMD on several occasions and was prescribed cough suppressants and PO lozenges without improvements. Patient was also given PO abx, however, she was unable to tolerate the antibiotics secondary to emesis. Patient was sent to the ER for further treatment was given vanc/zosyn in the ER. RVP showed HMPV and patient was admitted to the medicine service for further care. Presently, patient reports mild mid back pain. No other complaints. (2018 02:57)    on my interview: I agree . pt is a pretty poor historian and keeps telling me she got angioplasty in : She denies having any pulmonry disorder and says she used to smoke: She is on chemo for lung cacne r    ?FOLLOWING PRESENT  [x ] Hx of PE/DVT, [ x] Hx COPD, [x] Hx of Asthma, [y ] Hx of Hospitalization, [ x]  Hx of BiPAP/CPAP use, [ x] Hx of JUAN    Allergies    No Known Allergies    Intolerances        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  High cholesterol  CAD (Coronary Artery Disease): coronarary stent 1   Breast Cancer: s/l s/l lumpectomy  radiation  HTN (Hypertension):   S/P hysterectomy:   Cataracts, bilateral  S/P Breast Lumpectomy: L breast  Coronary Stent: x 1       FAMILY HISTORY:  No pertinent family history in first degree relatives      Social History: [ ex tobacco smoker  ] TOBACCO                  [x  ] ETOH                                 [ x ] IVDA/DRUGS    REVIEW OF SYSTEMS      General:x	    Skin/Breast:x  	  Ophthalmologic:x  	  ENMT:	x    Respiratory and Thorax: sob, wheezing   	  Cardiovascular:	    Gastrointestinal:	x    Genitourinary:	x    Musculoskeletal:	x    Neurological:	x    Psychiatric:	x    Hematology/Lymphatics:	x    Endocrine:	x    Allergic/Immunologic:	x    MEDICATIONS  (STANDING):  ALBUTerol/ipratropium for Nebulization 3 milliLiter(s) Nebulizer every 6 hours  amLODIPine   Tablet 5 milliGRAM(s) Oral daily  atorvastatin 20 milliGRAM(s) Oral at bedtime  buDESOnide   0.5 milliGRAM(s) Respule 0.5 milliGRAM(s) Inhalation two times a day  methylPREDNISolone sodium succinate Injectable 20 milliGRAM(s) IV Push every 12 hours  metoprolol succinate ER 50 milliGRAM(s) Oral daily  potassium chloride    Tablet ER 20 milliEquivalent(s) Oral every 2 hours    MEDICATIONS  (PRN):  acetaminophen   Tablet 650 milliGRAM(s) Oral every 6 hours PRN For Temp greater than 38 C (100.4 F) or mild pain       Vital Signs Last 24 Hrs  T(C): 36.1 (2018 05:22), Max: 38.3 (2018 21:43)  T(F): 96.9 (2018 05:22), Max: 100.9 (2018 21:43)  HR: 87 (2018 11:19) (87 - 119)  BP: 118/57 (2018 08:24) (106/58 - 122/59)  BP(mean): --  RR: 18 (2018 05:22) (18 - 19)  SpO2: 96% (2018 11:19) (96% - 98%)        I&O's Summary    2018 07:01  -  2018 12:32  --------------------------------------------------------  IN: 0 mL / OUT: 750 mL / NET: -750 mL        Physical Exam:   GENERAL: looks ill   HEENT: LIN/   Atraumatic, Normocephalic  ENMT: No tonsillar erythema, exudates, or enlargement; Moist mucous membranes, Good dentition, No lesions  NECK: Supple, No JVD, Normal thyroid  CHEST/LUNG: mild wheezing   CVS: Regular rate and rhythm; No murmurs, rubs, or gallops  GI: : Soft, Nontender, Nondistended; Bowel sounds present  NERVOUS SYSTEM:  Alert & Oriented X2  EXTREMITIES:  2+ Peripheral Pulses, No clubbing, cyanosis, or edema  LYMPH: No lymphadenopathy noted  SKIN: No rashes or lesions  ENDOCRINOLOGY: No Thyromegaly  PSYCH: Appropriate    Labs:  -1.3<37<4>>39<<7.415>>-1.3<<3><<4><<5<<399>>                            8.5    16.56 )-----------( 104      ( 2018 07:09 )             27.7                         8.9    10.37 )-----------( 82       ( 2018 07:05 )             29.1                         8.4    7.89  )-----------( 71       ( 2018 06:45 )             29.0                         8.8    6.22  )-----------( 100      ( 2018 18:15 )             28.2     04    146<H>  |  110<H>  |  39<H>  ----------------------------<  187<H>  3.0<L>   |  19<L>  |  2.31<H>      145  |  110<H>  |  29<H>  ----------------------------<  190<H>  3.7   |  16<L>  |  2.07<H>      140  |  107  |  28<H>  ----------------------------<  124<H>  3.8   |  17<L>  |  1.60<H>      142  |  106  |  36<H>  ----------------------------<  127<H>  4.2   |  21<L>  |  1.59<H>    Ca    8.5      2018 07:09  Ca    8.2<L>      2018 05:10  Mg     2.2         TPro  6.6  /  Alb  3.5  /  TBili  0.4  /  DBili  x   /  AST  27  /  ALT  18  /  AlkPhos  61      CAPILLARY BLOOD GLUCOSE            Urinalysis Basic - ( 2018 09:20 )    Color: YELLOW / Appearance: HAZY / S.015 / pH: 6.0  Gluc: NEGATIVE / Ketone: NEGATIVE  / Bili: NEGATIVE / Urobili: NORMAL mg/dL   Blood: NEGATIVE / Protein: 100 mg/dL / Nitrite: NEGATIVE   Leuk Esterase: NEGATIVE / RBC: 0-2 / WBC 2-5   Sq Epi: OCC / Non Sq Epi: x / Bacteria: x      D DImerLactate, Blood: 1.4 mmol/L ( @ 07:09)      Cultures:           Wound culture:                 @ 09:56  Organism --  Culture w/ gram stain --  Specimen Source BLOOD    Wound culture:                 @ 19:07  Organism --  Culture w/ gram stain --  Specimen Source BLOOD PERIPHERAL      Abscess culture:              @ 09:56  Organism --  Gram Stain --  Specimen Source BLOOD    Abscess culture:              @ 19:07  Organism --  Gram Stain --  Specimen Source BLOOD PERIPHERAL        Tissue culture:            @ 09:56  Organism --  Gram Stain --  Specimen Source BLOOD    Tissue culture:            @ 19:07  Organism --  Gram Stain --  Specimen Source BLOOD PERIPHERAL      Body Fluid Smear & Culture:                         @ 09:56  AFB Smear  --  Culture Acid Fast Body Fluid w/ Smear  --  Culture Acid Fast Smear Concentrated   --    Culture Results:     --  Specimen Source BLOOD    Body Fluid Smear & Culture:                         @ 19:07  AFB Smear  --  Culture Acid Fast Body Fluid w/ Smear  --  Culture Acid Fast Smear Concentrated   --    Culture Results:     --  Specimen Source BLOOD PERIPHERAL        Rapid Respiratory Viral Panel Result         @ 19:00  Rapid RVP --  Coronovirus --  Adenovirus NOT DETECTED  Bordetella Pertussis NOT DETECTED  Chlamydia Pneumonia NOT DETECTED  Entero/RhinovirusNOT DETECTED  HKU1 Coronovirus --  HMPV Coronovirus POSITIVE  Influenza A NOT DETECTED (any subtype)  Influenza AH1 NOT DETECTED  Influenza AH1 2009 NOT DETECTED  Influenza AH3 NOT DETECTED  Influenza B NOT DETECTED  Mycoplasma Pneumoniae NOT DETECTED This nucleic acid amplification assay was performed using  the [employer]. The following pathogens are tested  for: Adenovirus, Coronavirus 229E, Coronavirus HKU1,  Coronavirus NL63, Coronavirus OC43, Human Metapneumovirus  (HMPV), Rhinovirus/Enterovirus, Influenza A H1, Influenza A  H1 2009 (Pandemic H1 2009), Influenza A H3, Influenza A (Flu  A) subtype not identified, Influenza B, Parainfluenza Virus  (types 1, 2, 3, 4), Respiratory Syncytial Virus (RSV),  Bordetella pertussis, Chlamydophila pneumoniae, and  Mycoplasma pneumoniae. A negative FilmArray result does not  always exclude the possibility of viral or bacterial  infection. Laboratory results should always be interpreted  in the context of clinical findings.  NL63 Coronovirus --  OC43 Coronovirus --  Parainfluenza 1 NOT DETECTED  Parainfluenza 2 NOT DETECTED  Parainfluenza 3 NOT DETECTED  Parainfluenza 4 NOT DETECTED  Resp Syncytial Virus NOT DETECTED        Studies  Chest X-RAY  CT SCAN Chest   CT Abdomen  Venous Dopplers: LE:   Others    < from: CT Head No Cont (18 @ 15:08) >      INTERPRETATION:  History: Syncope headache.    Multiple axial sections were performed from base of skull to vertex   without contrast enhancement. Coronal and sagittal reconstructions were  performed as well    This exam is compared with prior noncontrast head CT performed on   2010.    Parenchymal volume loss and chronic white vessel changes are identified    There is no evidence of acute hemorrhage, mass mass effect in the   posterior fossa or supratentorial region.    Evaluation of the osseous structures with the appropriate window appears   unremarkable    The visualized paranasal sinuses mastoid and middle ear regions appear   clear.    Impression: No evidence of acute hemorrhage, mass mass effect..                  RAÚL JORDAN M.D., ATTENDING RADIOLOGIST  This document has been electronically signed. 2018  3:34PM    < end of copied text >        < from: Xray Chest 1 View AP/PA (18 @ 20:31) >      PROCEDURE DATE:  2018         INTERPRETATION:  EXAMINATION: XR CHEST AP OR PA 1V    CLINICAL INDICATION:  Fever. Cough. On chemotherapy.     TECHNIQUE: Frontal radiograph of the chest was obtained on 2018     COMPARISON: Chest radiograph 2015.    FINDINGS:     Right chest wall port with tip overlying the SVC. The cardiac silhouette   is normal in size. There is no pleural effusion. There is no   pneumothorax. No focal consolidation identified.There are degenerative   changes of the visualized thoracic spine. Aortic calcifications.    IMPRESSION:   No acute pulmonary disease.              VALERIA COLEY M.D., RADIOLOGY RESIDENT  This document has been electronically signed.  CM PAULINO; ATTENDING RADIOLOGIST  This document has been electronically signed. Apr 25 2018  7:07AM    < end of copied text >  1

## 2018-04-27 NOTE — CONSULT NOTE ADULT - SUBJECTIVE AND OBJECTIVE BOX
[facility] Consult Note Nephrology - CONSULTATION NOTE    Patient is a 89y Female with HTN, CAD s/p PCI, breast cancer on chemo a/e SOB and cough. Pt is poor historian, and most information from chart. Pt being seen by PMD over past couple weeks for cough and PARRA. Symptoms not improved with PO abx, and OTC antitussive syrups. Pt exam, pt endorses cough and generalized weakness. Denies fever or chills. Admission labs significant for +hMPV. BCx neg. Renal consult for elevated cr to 2.0 today.   Of note, renal US without evidence of hydronephrosis, but PVR >600ml in bladder.     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  High cholesterol  CAD (Coronary Artery Disease): coronarary stent 2010  Breast Cancer: s/l s/l lumpectomy  radiation  HTN (Hypertension):   S/P hysterectomy:   Cataracts, bilateral  S/P Breast Lumpectomy: L breast  Coronary Stent: x 1     No Known Allergies    Home Medications Reviewed  Hospital Medications:   MEDICATIONS  (STANDING):  ALBUTerol/ipratropium for Nebulization 3 milliLiter(s) Nebulizer every 6 hours  amLODIPine   Tablet 5 milliGRAM(s) Oral daily  atorvastatin 20 milliGRAM(s) Oral at bedtime  buDESOnide   0.5 milliGRAM(s) Respule 0.5 milliGRAM(s) Inhalation two times a day  methylPREDNISolone sodium succinate Injectable 20 milliGRAM(s) IV Push every 12 hours  metoprolol succinate ER 50 milliGRAM(s) Oral daily  potassium chloride    Tablet ER 20 milliEquivalent(s) Oral every 2 hours    SOCIAL HISTORY:  Denies ETOh,Smoking,   FAMILY HISTORY:  No pertinent family history in first degree relatives    REVIEW OF SYSTEMS:  CONSTITUTIONAL: +weakness, No fevers or chills  EYES/ENT: No visual changes;  No vertigo or throat pain   NECK: No pain or stiffness  RESPIRATORY: +cough, +wheezing, hemoptysis; +shortness of breath  CARDIOVASCULAR: No chest pain or palpitations.  GASTROINTESTINAL: No abdominal or epigastric pain. No nausea, vomiting, or hematemesis; No diarrhea or constipation. No melena or hematochezia.  GENITOURINARY: No dysuria, frequency, foamy urine, urinary urgency, incontinence or hematuria  NEUROLOGICAL: No numbness or weakness  SKIN: No itching, burning, rashes, or lesions   VASCULAR: No bilateral lower extremity edema.   All other review of systems is negative unless indicated above.    VITALS:  T(F): 96.9 (18 @ 05:22), Max: 100.9 (18 @ 21:43)  HR: 87 (18 @ 11:19)  BP: 118/57 (18 @ 08:24)  RR: 18 (18 @ 05:22)  SpO2: 96% (18 @ 11:19)  Wt(kg): --      PHYSICAL EXAM:  Constitutional: NAD  HEENT: anicteric sclera, oropharynx clear, MMM  Neck: No JVD  Respiratory: CTAB, no wheezes, rales or rhonchi  Cardiovascular: S1, S2, RRR  Gastrointestinal: BS+, soft, NT/ND  Extremities: No cyanosis or clubbing. No peripheral edema  Neurological: A/O x 2, no focal deficits  Psychiatric: Normal mood, normal affect  : No CVA tenderness. No alexander.   Skin: No rashes    LABS:      146<H>  |  110<H>  |  39<H>  ----------------------------<  187<H>  3.0<L>   |  19<L>  |  2.31<H>    Ca    8.5      2018 07:09  Mg     2.2           Creatinine Trend: 2.31 <--, 2.07 <--, 1.60 <--, 1.59 <--                        8.5    16.56 )-----------( 104      ( 2018 07:09 )             27.7     Urine Studies:  Urinalysis Basic - ( 2018 09:20 )    Color: YELLOW / Appearance: HAZY / S.015 / pH: 6.0  Gluc: NEGATIVE / Ketone: NEGATIVE  / Bili: NEGATIVE / Urobili: NORMAL mg/dL   Blood: NEGATIVE / Protein: 100 mg/dL / Nitrite: NEGATIVE   Leuk Esterase: NEGATIVE / RBC: 0-2 / WBC 2-5   Sq Epi: OCC / Non Sq Epi:  / Bacteria:         RADIOLOGY & ADDITIONAL STUDIES:  < from: US Renal (18 @ 09:32) >  FINDINGS:    Right kidney:  8 cm. No renal mass, hydronephrosis or calculi.    Left kidney:  8 cm. No renal mass, hydronephrosis or calculi.    Parenchymal renal disease.    Urinary bladder: Increased post void residual was 608 cc.    IMPRESSION:     Urinary retention.  Parenchymal renal disease.    < end of copied text >    < from: Xray Chest 1 View AP/PA (18 @ 20:31) >  IMPRESSION:   No acute pulmonary disease.    < end of copied text >

## 2018-04-27 NOTE — PROGRESS NOTE ADULT - PROBLEM SELECTOR PLAN 1
- secondary to hMPV  - Symptomatic treatment with inhaled steroids, duonebs, and supplemental O2  - stable off of abx for now  - On IV Solumedrol Q8hr with improved wheezing. Will taper to BID today.   - CT chest pending. Pulm Dr. Franz consulted.  - (Metabolic encephalopathy due to sepsis from HMPV - resolved). CT head is neg.

## 2018-04-27 NOTE — CONSULT NOTE ADULT - PROBLEM SELECTOR RECOMMENDATION 9
pt with hMPV coronavirus infection with wheezing: For now cont iv steroids 20 q 12 h ours: and cont nebs with pulmicort:  Keep o2 sao2 above 88%: Venous PH 7.41

## 2018-04-28 DIAGNOSIS — I10 ESSENTIAL (PRIMARY) HYPERTENSION: ICD-10-CM

## 2018-04-28 DIAGNOSIS — E83.39 OTHER DISORDERS OF PHOSPHORUS METABOLISM: ICD-10-CM

## 2018-04-28 DIAGNOSIS — N17.9 ACUTE KIDNEY FAILURE, UNSPECIFIED: ICD-10-CM

## 2018-04-28 DIAGNOSIS — R33.9 RETENTION OF URINE, UNSPECIFIED: ICD-10-CM

## 2018-04-28 LAB
BUN SERPL-MCNC: 46 MG/DL — HIGH (ref 7–23)
CALCIUM SERPL-MCNC: 8.9 MG/DL — SIGNIFICANT CHANGE UP (ref 8.4–10.5)
CHLORIDE SERPL-SCNC: 110 MMOL/L — HIGH (ref 98–107)
CO2 SERPL-SCNC: 21 MMOL/L — LOW (ref 22–31)
CREAT SERPL-MCNC: 2.16 MG/DL — HIGH (ref 0.5–1.3)
GLUCOSE SERPL-MCNC: 175 MG/DL — HIGH (ref 70–99)
HCT VFR BLD CALC: 33.5 % — LOW (ref 34.5–45)
HGB BLD-MCNC: 10.4 G/DL — LOW (ref 11.5–15.5)
MAGNESIUM SERPL-MCNC: 2.4 MG/DL — SIGNIFICANT CHANGE UP (ref 1.6–2.6)
MCHC RBC-ENTMCNC: 31 % — LOW (ref 32–36)
MCHC RBC-ENTMCNC: 31.4 PG — SIGNIFICANT CHANGE UP (ref 27–34)
MCV RBC AUTO: 101.2 FL — HIGH (ref 80–100)
NRBC # FLD: 0.07 — SIGNIFICANT CHANGE UP
PHOSPHATE SERPL-MCNC: 2.4 MG/DL — LOW (ref 2.5–4.5)
PLATELET # BLD AUTO: 95 K/UL — LOW (ref 150–400)
PMV BLD: 11.5 FL — SIGNIFICANT CHANGE UP (ref 7–13)
POTASSIUM SERPL-MCNC: 4.3 MMOL/L — SIGNIFICANT CHANGE UP (ref 3.5–5.3)
POTASSIUM SERPL-SCNC: 4.3 MMOL/L — SIGNIFICANT CHANGE UP (ref 3.5–5.3)
RBC # BLD: 3.31 M/UL — LOW (ref 3.8–5.2)
RBC # FLD: 16.4 % — HIGH (ref 10.3–14.5)
SODIUM SERPL-SCNC: 144 MMOL/L — SIGNIFICANT CHANGE UP (ref 135–145)
WBC # BLD: 11.11 K/UL — HIGH (ref 3.8–10.5)
WBC # FLD AUTO: 11.11 K/UL — HIGH (ref 3.8–10.5)

## 2018-04-28 RX ORDER — POLYETHYLENE GLYCOL 3350 17 G/17G
17 POWDER, FOR SOLUTION ORAL AT BEDTIME
Qty: 0 | Refills: 0 | Status: DISCONTINUED | OUTPATIENT
Start: 2018-04-28 | End: 2018-05-02

## 2018-04-28 RX ORDER — DOCUSATE SODIUM 100 MG
100 CAPSULE ORAL THREE TIMES A DAY
Qty: 0 | Refills: 0 | Status: DISCONTINUED | OUTPATIENT
Start: 2018-04-28 | End: 2018-04-30

## 2018-04-28 RX ORDER — CHLORHEXIDINE GLUCONATE 213 G/1000ML
1 SOLUTION TOPICAL DAILY
Qty: 0 | Refills: 0 | Status: DISCONTINUED | OUTPATIENT
Start: 2018-04-28 | End: 2018-05-02

## 2018-04-28 RX ORDER — SODIUM,POTASSIUM PHOSPHATES 278-250MG
1 POWDER IN PACKET (EA) ORAL
Qty: 0 | Refills: 0 | Status: COMPLETED | OUTPATIENT
Start: 2018-04-28 | End: 2018-04-29

## 2018-04-28 RX ADMIN — Medication 20 MILLIGRAM(S): at 17:18

## 2018-04-28 RX ADMIN — Medication 20 MILLIGRAM(S): at 05:03

## 2018-04-28 RX ADMIN — AMLODIPINE BESYLATE 5 MILLIGRAM(S): 2.5 TABLET ORAL at 05:02

## 2018-04-28 RX ADMIN — Medication 100 MILLIGRAM(S): at 21:51

## 2018-04-28 RX ADMIN — ATORVASTATIN CALCIUM 20 MILLIGRAM(S): 80 TABLET, FILM COATED ORAL at 21:51

## 2018-04-28 RX ADMIN — Medication 100 MILLIGRAM(S): at 13:17

## 2018-04-28 RX ADMIN — Medication 3 MILLILITER(S): at 04:42

## 2018-04-28 RX ADMIN — CHLORHEXIDINE GLUCONATE 1 APPLICATION(S): 213 SOLUTION TOPICAL at 11:29

## 2018-04-28 RX ADMIN — Medication 3 MILLILITER(S): at 10:55

## 2018-04-28 RX ADMIN — Medication 3 MILLILITER(S): at 21:01

## 2018-04-28 RX ADMIN — Medication 1 TABLET(S): at 17:18

## 2018-04-28 RX ADMIN — Medication 0.5 MILLIGRAM(S): at 21:15

## 2018-04-28 RX ADMIN — Medication 50 MILLIGRAM(S): at 05:03

## 2018-04-28 RX ADMIN — Medication 3 MILLILITER(S): at 16:15

## 2018-04-28 RX ADMIN — Medication 0.5 MILLIGRAM(S): at 10:55

## 2018-04-28 NOTE — PROGRESS NOTE ADULT - PROBLEM SELECTOR PLAN 1
pt with hMPV coronavirus infection with wheezing: For now cont iv steroids 20 q 12 h ours: and cont nebs with pulmicort:  Keep o2 sao2 above 88%: Venous PH 7.41.   4/28 stable. WBC trending down. lactate 1.4 as yesterday. stable vital signs

## 2018-04-28 NOTE — PROGRESS NOTE ADULT - PROBLEM SELECTOR PLAN 1
- secondary to hMPV  - Symptomatic treatment with inhaled steroids, duonebs, and supplemental O2  - stable off of abx for now  - On IV Solumedrol Q12hr with improved wheezing.   - CT chest with questionable multifocal PNA but given hx of c.diff and her improved respiratory symptoms off abx, will continue to monitor off abx. ID input appreciated.   - (Metabolic encephalopathy due to sepsis from HMPV - resolved). CT head is neg.

## 2018-04-29 LAB
BACTERIA BLD CULT: SIGNIFICANT CHANGE UP
BACTERIA BLD CULT: SIGNIFICANT CHANGE UP
BUN SERPL-MCNC: 48 MG/DL — HIGH (ref 7–23)
CALCIUM SERPL-MCNC: 8.4 MG/DL — SIGNIFICANT CHANGE UP (ref 8.4–10.5)
CHLORIDE SERPL-SCNC: 108 MMOL/L — HIGH (ref 98–107)
CO2 SERPL-SCNC: 22 MMOL/L — SIGNIFICANT CHANGE UP (ref 22–31)
CREAT SERPL-MCNC: 1.98 MG/DL — HIGH (ref 0.5–1.3)
GLUCOSE SERPL-MCNC: 196 MG/DL — HIGH (ref 70–99)
HCT VFR BLD CALC: 25.6 % — LOW (ref 34.5–45)
HGB BLD-MCNC: 8.1 G/DL — LOW (ref 11.5–15.5)
MAGNESIUM SERPL-MCNC: 2.3 MG/DL — SIGNIFICANT CHANGE UP (ref 1.6–2.6)
MCHC RBC-ENTMCNC: 31.6 % — LOW (ref 32–36)
MCHC RBC-ENTMCNC: 32.3 PG — SIGNIFICANT CHANGE UP (ref 27–34)
MCV RBC AUTO: 102 FL — HIGH (ref 80–100)
NRBC # FLD: 0.1 — SIGNIFICANT CHANGE UP
PHOSPHATE SERPL-MCNC: 2.7 MG/DL — SIGNIFICANT CHANGE UP (ref 2.5–4.5)
PLATELET # BLD AUTO: 119 K/UL — LOW (ref 150–400)
PMV BLD: 10.8 FL — SIGNIFICANT CHANGE UP (ref 7–13)
POTASSIUM SERPL-MCNC: 4.5 MMOL/L — SIGNIFICANT CHANGE UP (ref 3.5–5.3)
POTASSIUM SERPL-SCNC: 4.5 MMOL/L — SIGNIFICANT CHANGE UP (ref 3.5–5.3)
RBC # BLD: 2.51 M/UL — LOW (ref 3.8–5.2)
RBC # FLD: 16 % — HIGH (ref 10.3–14.5)
SODIUM SERPL-SCNC: 144 MMOL/L — SIGNIFICANT CHANGE UP (ref 135–145)
WBC # BLD: 12.61 K/UL — HIGH (ref 3.8–10.5)
WBC # FLD AUTO: 12.61 K/UL — HIGH (ref 3.8–10.5)

## 2018-04-29 RX ORDER — SODIUM CHLORIDE 9 MG/ML
500 INJECTION INTRAMUSCULAR; INTRAVENOUS; SUBCUTANEOUS ONCE
Qty: 0 | Refills: 0 | Status: COMPLETED | OUTPATIENT
Start: 2018-04-29 | End: 2018-04-29

## 2018-04-29 RX ADMIN — Medication 100 MILLIGRAM(S): at 05:07

## 2018-04-29 RX ADMIN — Medication 3 MILLILITER(S): at 16:27

## 2018-04-29 RX ADMIN — CHLORHEXIDINE GLUCONATE 1 APPLICATION(S): 213 SOLUTION TOPICAL at 12:49

## 2018-04-29 RX ADMIN — Medication 3 MILLILITER(S): at 03:50

## 2018-04-29 RX ADMIN — Medication 20 MILLIGRAM(S): at 05:07

## 2018-04-29 RX ADMIN — ATORVASTATIN CALCIUM 20 MILLIGRAM(S): 80 TABLET, FILM COATED ORAL at 21:52

## 2018-04-29 RX ADMIN — Medication 20 MILLIGRAM(S): at 17:48

## 2018-04-29 RX ADMIN — Medication 100 MILLIGRAM(S): at 12:49

## 2018-04-29 RX ADMIN — Medication 100 MILLIGRAM(S): at 21:52

## 2018-04-29 RX ADMIN — Medication 1 TABLET(S): at 08:39

## 2018-04-29 RX ADMIN — Medication 0.5 MILLIGRAM(S): at 10:10

## 2018-04-29 RX ADMIN — Medication 0.5 MILLIGRAM(S): at 21:38

## 2018-04-29 RX ADMIN — Medication 3 MILLILITER(S): at 21:28

## 2018-04-29 RX ADMIN — Medication 3 MILLILITER(S): at 10:10

## 2018-04-29 RX ADMIN — SODIUM CHLORIDE 1000 MILLILITER(S): 9 INJECTION INTRAMUSCULAR; INTRAVENOUS; SUBCUTANEOUS at 05:38

## 2018-04-29 NOTE — CHART NOTE - NSCHARTNOTEFT_GEN_A_CORE
Notified by RN patient voided 20cc tonight.  Bladder scan with PVR 472cc, straight cath x1 ordered.  Straight cath with 600cc of urine output.  Will continue to monitor.

## 2018-04-29 NOTE — PROGRESS NOTE ADULT - PROBLEM SELECTOR PLAN 1
- secondary to hMPV  - Symptomatic treatment with inhaled steroids, duonebs, and supplemental O2  - stable off of abx for now  - On IV Solumedrol Q12hr with improved wheezing.   - CT chest with questionable multifocal PNA but given hx of c.diff and her improved respiratory symptoms off abx, will continue to monitor off abx. ID input appreciated. kennedy viral PNA.   - (Metabolic encephalopathy due to sepsis from HMPV - resolved). CT head is neg.

## 2018-04-29 NOTE — PROGRESS NOTE ADULT - PROBLEM SELECTOR PLAN 1
Cr trending down  Azotemia partly 2/2 steroids  Check daily BMP  no indication for IVF now  avoid ACEi/ARB,NSAIDs,contrast.

## 2018-04-29 NOTE — PROGRESS NOTE ADULT - PROBLEM SELECTOR PLAN 1
pt with hMPV coronavirus infection with wheezing: For now cont iv steroids 20 q 12 h ours: and cont nebs with pulmicort:  Keep o2 sao2 above 88%: Venous PH 7.41.   4/28 stable. WBC trending down. lactate 1.4 as yesterday. stable vital signs  4/29: still wheezing: cont steroids : off antibiotics and remains stable

## 2018-04-29 NOTE — PROVIDER CONTACT NOTE (OTHER) - ACTION/TREATMENT ORDERED:
Reschedule blood pressure medications amlodipine and Metroprolol.  Inform oncoming nurse to reassess blood pressure.
IV Fluid Bolus
Straight catheter bladder.
Straight catheter residual urine.
will continue to monitor
will monitor

## 2018-04-30 LAB
BUN SERPL-MCNC: 38 MG/DL — HIGH (ref 7–23)
CALCIUM SERPL-MCNC: 8.4 MG/DL — SIGNIFICANT CHANGE UP (ref 8.4–10.5)
CHLORIDE SERPL-SCNC: 108 MMOL/L — HIGH (ref 98–107)
CO2 SERPL-SCNC: 24 MMOL/L — SIGNIFICANT CHANGE UP (ref 22–31)
CREAT SERPL-MCNC: 1.66 MG/DL — HIGH (ref 0.5–1.3)
FOLATE RBC-MCNC: 1250 — SIGNIFICANT CHANGE UP
GLUCOSE SERPL-MCNC: 117 MG/DL — HIGH (ref 70–99)
HCT VFR BLD CALC: 23.9 % — LOW (ref 34.5–45)
HCT VFR BLD CALC: 27.1 % — LOW (ref 34.5–45)
HCT VFR BLD CALC: 28 % — LOW (ref 34.5–45)
HGB BLD-MCNC: 7.3 G/DL — LOW (ref 11.5–15.5)
HGB BLD-MCNC: 8.6 G/DL — LOW (ref 11.5–15.5)
MAGNESIUM SERPL-MCNC: 2.1 MG/DL — SIGNIFICANT CHANGE UP (ref 1.6–2.6)
MCHC RBC-ENTMCNC: 30.5 % — LOW (ref 32–36)
MCHC RBC-ENTMCNC: 31.1 PG — SIGNIFICANT CHANGE UP (ref 27–34)
MCHC RBC-ENTMCNC: 31.7 % — LOW (ref 32–36)
MCHC RBC-ENTMCNC: 32.3 PG — SIGNIFICANT CHANGE UP (ref 27–34)
MCV RBC AUTO: 101.7 FL — HIGH (ref 80–100)
MCV RBC AUTO: 101.9 FL — HIGH (ref 80–100)
NRBC # FLD: 0.2 — SIGNIFICANT CHANGE UP
NRBC # FLD: 0.2 — SIGNIFICANT CHANGE UP
NRBC FLD-RTO: 1.6 — SIGNIFICANT CHANGE UP
NRBC FLD-RTO: 1.8 — SIGNIFICANT CHANGE UP
PHOSPHATE SERPL-MCNC: 3.4 MG/DL — SIGNIFICANT CHANGE UP (ref 2.5–4.5)
PLATELET # BLD AUTO: 121 K/UL — LOW (ref 150–400)
PLATELET # BLD AUTO: 139 K/UL — LOW (ref 150–400)
PMV BLD: 11 FL — SIGNIFICANT CHANGE UP (ref 7–13)
PMV BLD: 11.1 FL — SIGNIFICANT CHANGE UP (ref 7–13)
POTASSIUM SERPL-MCNC: 4.3 MMOL/L — SIGNIFICANT CHANGE UP (ref 3.5–5.3)
POTASSIUM SERPL-SCNC: 4.3 MMOL/L — SIGNIFICANT CHANGE UP (ref 3.5–5.3)
RBC # BLD: 2.35 M/UL — LOW (ref 3.8–5.2)
RBC # BLD: 2.66 M/UL — LOW (ref 3.8–5.2)
RBC # FLD: 15.9 % — HIGH (ref 10.3–14.5)
RBC # FLD: 16.2 % — HIGH (ref 10.3–14.5)
SODIUM SERPL-SCNC: 143 MMOL/L — SIGNIFICANT CHANGE UP (ref 135–145)
WBC # BLD: 10.9 K/UL — HIGH (ref 3.8–10.5)
WBC # BLD: 12.76 K/UL — HIGH (ref 3.8–10.5)
WBC # FLD AUTO: 10.9 K/UL — HIGH (ref 3.8–10.5)
WBC # FLD AUTO: 12.76 K/UL — HIGH (ref 3.8–10.5)

## 2018-04-30 RX ADMIN — Medication 3 MILLILITER(S): at 09:44

## 2018-04-30 RX ADMIN — Medication 30 MILLIGRAM(S): at 12:48

## 2018-04-30 RX ADMIN — ATORVASTATIN CALCIUM 20 MILLIGRAM(S): 80 TABLET, FILM COATED ORAL at 22:47

## 2018-04-30 RX ADMIN — Medication 3 MILLILITER(S): at 03:48

## 2018-04-30 RX ADMIN — Medication 3 MILLILITER(S): at 17:15

## 2018-04-30 RX ADMIN — Medication 3 MILLILITER(S): at 22:05

## 2018-04-30 RX ADMIN — Medication 20 MILLIGRAM(S): at 05:29

## 2018-04-30 RX ADMIN — CHLORHEXIDINE GLUCONATE 1 APPLICATION(S): 213 SOLUTION TOPICAL at 12:26

## 2018-04-30 RX ADMIN — Medication 0.5 MILLIGRAM(S): at 09:44

## 2018-04-30 RX ADMIN — Medication 0.5 MILLIGRAM(S): at 22:15

## 2018-04-30 RX ADMIN — Medication 100 MILLIGRAM(S): at 05:29

## 2018-04-30 NOTE — PROGRESS NOTE ADULT - PROBLEM SELECTOR PLAN 1
Cr continues to trend down  Azotemia partly 2/2 steroids  Check daily BMP  no indication for IVF now  avoid ACEi/ARB for now/NSAIDs/iv contrast.

## 2018-04-30 NOTE — PROGRESS NOTE ADULT - PROBLEM SELECTOR PLAN 1
pt with hMPV coronavirus infection with wheezing: For now cont iv steroids 20 q 12 h ours: and cont nebs with pulmicort:  Keep o2 sao2 above 88%: Venous PH 7.41.   4/28 stable. WBC trending down. lactate 1.4 as yesterday. stable vital signs  4/29: still wheezing: cont steroids : off antibiotics and remains stable  4/30: Doing ok : no wheezing today: change to po steroids for a short term

## 2018-04-30 NOTE — PROGRESS NOTE ADULT - PROBLEM SELECTOR PLAN 1
- secondary to hMPV  - Symptomatic treatment with inhaled steroids, duonebs, and supplemental O2  - stable off of abx for now  - On IV Solumedrol Q12hr with improved wheezing. taper per pulm.  - CT chest with questionable multifocal PNA but given hx of c.diff and her improved respiratory symptoms off abx, will continue to monitor off abx. ID input appreciated. kennedy viral PNA. stable.   - (Metabolic encephalopathy due to sepsis from HMPV - resolved). CT head is neg.

## 2018-05-01 LAB
BACTERIA BLD CULT: SIGNIFICANT CHANGE UP
BACTERIA BLD CULT: SIGNIFICANT CHANGE UP
BUN SERPL-MCNC: 34 MG/DL — HIGH (ref 7–23)
CALCIUM SERPL-MCNC: 9 MG/DL — SIGNIFICANT CHANGE UP (ref 8.4–10.5)
CHLORIDE SERPL-SCNC: 103 MMOL/L — SIGNIFICANT CHANGE UP (ref 98–107)
CO2 SERPL-SCNC: 27 MMOL/L — SIGNIFICANT CHANGE UP (ref 22–31)
CREAT SERPL-MCNC: 1.39 MG/DL — HIGH (ref 0.5–1.3)
GLUCOSE SERPL-MCNC: 169 MG/DL — HIGH (ref 70–99)
HCT VFR BLD CALC: 28.7 % — LOW (ref 34.5–45)
HGB BLD-MCNC: 8.8 G/DL — LOW (ref 11.5–15.5)
MAGNESIUM SERPL-MCNC: 2 MG/DL — SIGNIFICANT CHANGE UP (ref 1.6–2.6)
MCHC RBC-ENTMCNC: 30.7 % — LOW (ref 32–36)
MCHC RBC-ENTMCNC: 31.3 PG — SIGNIFICANT CHANGE UP (ref 27–34)
MCV RBC AUTO: 102.1 FL — HIGH (ref 80–100)
NRBC # FLD: 0.14 — SIGNIFICANT CHANGE UP
NRBC FLD-RTO: 1 — SIGNIFICANT CHANGE UP
PHOSPHATE SERPL-MCNC: 2.9 MG/DL — SIGNIFICANT CHANGE UP (ref 2.5–4.5)
PLATELET # BLD AUTO: 143 K/UL — LOW (ref 150–400)
PMV BLD: 11.5 FL — SIGNIFICANT CHANGE UP (ref 7–13)
POTASSIUM SERPL-MCNC: 5.1 MMOL/L — SIGNIFICANT CHANGE UP (ref 3.5–5.3)
POTASSIUM SERPL-SCNC: 5.1 MMOL/L — SIGNIFICANT CHANGE UP (ref 3.5–5.3)
RBC # BLD: 2.81 M/UL — LOW (ref 3.8–5.2)
RBC # FLD: 16 % — HIGH (ref 10.3–14.5)
SODIUM SERPL-SCNC: 141 MMOL/L — SIGNIFICANT CHANGE UP (ref 135–145)
WBC # BLD: 14.72 K/UL — HIGH (ref 3.8–10.5)
WBC # FLD AUTO: 14.72 K/UL — HIGH (ref 3.8–10.5)

## 2018-05-01 RX ADMIN — Medication 3 MILLILITER(S): at 03:53

## 2018-05-01 RX ADMIN — Medication 30 MILLIGRAM(S): at 06:24

## 2018-05-01 RX ADMIN — Medication 0.5 MILLIGRAM(S): at 21:55

## 2018-05-01 RX ADMIN — ATORVASTATIN CALCIUM 20 MILLIGRAM(S): 80 TABLET, FILM COATED ORAL at 21:02

## 2018-05-01 RX ADMIN — Medication 3 MILLILITER(S): at 12:20

## 2018-05-01 RX ADMIN — Medication 3 MILLILITER(S): at 21:56

## 2018-05-01 RX ADMIN — CHLORHEXIDINE GLUCONATE 1 APPLICATION(S): 213 SOLUTION TOPICAL at 12:15

## 2018-05-01 RX ADMIN — Medication 0.5 MILLIGRAM(S): at 12:23

## 2018-05-01 RX ADMIN — Medication 3 MILLILITER(S): at 17:10

## 2018-05-01 RX ADMIN — Medication 50 MILLIGRAM(S): at 06:24

## 2018-05-01 NOTE — PROGRESS NOTE ADULT - PROBLEM SELECTOR PLAN 1
pt with hMPV coronavirus infection with wheezing: For now cont iv steroids 20 q 12 h ours: and cont nebs with pulmicort:  Keep o2 sao2 above 88%: Venous PH 7.41.   4/28 stable. WBC trending down. lactate 1.4 as yesterday. stable vital signs  4/29: still wheezing: cont steroids : off antibiotics and remains stable  4/30: Doing ok : no wheezing today: change to po steroids for a short term  5/1: doing ok: remsin off antibiotics::

## 2018-05-01 NOTE — PROGRESS NOTE ADULT - PROBLEM SELECTOR PLAN 1
Cr continues to trend down, cr dec to 1.6 yesterday, no labs from today  Azotemia partly 2/2 steroids  check bmp today  no indication for IVF now  bilateral crepts, likely due to Viral  broncholitis./pneumonia.no evidence of fluid overload

## 2018-05-01 NOTE — PROGRESS NOTE ADULT - PROBLEM SELECTOR PLAN 1
- secondary to hMPV  - Symptomatic treatment with inhaled steroids, duonebs, and supplemental O2  - stable off of abx f  - On IV Solumedrol Q12hr with improved wheezing. taper per pulm.   - switch to PO on discharge.  - CT chest with questionable multifocal PNA but given hx of c.diff and her improved respiratory symptoms off abx, will continue to monitor off abx. ID input appreciated. kennedy viral PNA. stable.   - (Metabolic encephalopathy due to sepsis from HMPV - resolved). CT head is neg.

## 2018-05-01 NOTE — PROGRESS NOTE ADULT - PROBLEM SELECTOR PLAN 8
- Heparin 5,000 units s/c Q8hrs

## 2018-05-02 ENCOUNTER — TRANSCRIPTION ENCOUNTER (OUTPATIENT)
Age: 83
End: 2018-05-02

## 2018-05-02 VITALS — OXYGEN SATURATION: 97 %

## 2018-05-02 LAB
BUN SERPL-MCNC: 30 MG/DL — HIGH (ref 7–23)
CALCIUM SERPL-MCNC: 8.7 MG/DL — SIGNIFICANT CHANGE UP (ref 8.4–10.5)
CHLORIDE SERPL-SCNC: 104 MMOL/L — SIGNIFICANT CHANGE UP (ref 98–107)
CO2 SERPL-SCNC: 29 MMOL/L — SIGNIFICANT CHANGE UP (ref 22–31)
CREAT SERPL-MCNC: 1.46 MG/DL — HIGH (ref 0.5–1.3)
GLUCOSE SERPL-MCNC: 97 MG/DL — SIGNIFICANT CHANGE UP (ref 70–99)
HCT VFR BLD CALC: 26.2 % — LOW (ref 34.5–45)
HGB BLD-MCNC: 8 G/DL — LOW (ref 11.5–15.5)
MAGNESIUM SERPL-MCNC: 1.8 MG/DL — SIGNIFICANT CHANGE UP (ref 1.6–2.6)
MCHC RBC-ENTMCNC: 30.5 % — LOW (ref 32–36)
MCHC RBC-ENTMCNC: 31.5 PG — SIGNIFICANT CHANGE UP (ref 27–34)
MCV RBC AUTO: 103.1 FL — HIGH (ref 80–100)
NRBC # FLD: 0.13 — SIGNIFICANT CHANGE UP
NRBC FLD-RTO: 1 — SIGNIFICANT CHANGE UP
PHOSPHATE SERPL-MCNC: 2.6 MG/DL — SIGNIFICANT CHANGE UP (ref 2.5–4.5)
PLATELET # BLD AUTO: 140 K/UL — LOW (ref 150–400)
PMV BLD: 11.2 FL — SIGNIFICANT CHANGE UP (ref 7–13)
POTASSIUM SERPL-MCNC: 4.1 MMOL/L — SIGNIFICANT CHANGE UP (ref 3.5–5.3)
POTASSIUM SERPL-SCNC: 4.1 MMOL/L — SIGNIFICANT CHANGE UP (ref 3.5–5.3)
RBC # BLD: 2.54 M/UL — LOW (ref 3.8–5.2)
RBC # FLD: 16.1 % — HIGH (ref 10.3–14.5)
SODIUM SERPL-SCNC: 142 MMOL/L — SIGNIFICANT CHANGE UP (ref 135–145)
WBC # BLD: 13.1 K/UL — HIGH (ref 3.8–10.5)
WBC # FLD AUTO: 13.1 K/UL — HIGH (ref 3.8–10.5)

## 2018-05-02 RX ORDER — BUDESONIDE, MICRONIZED 100 %
1 POWDER (GRAM) MISCELLANEOUS
Qty: 0 | Refills: 0 | COMMUNITY
Start: 2018-05-02

## 2018-05-02 RX ORDER — POLYETHYLENE GLYCOL 3350 17 G/17G
17 POWDER, FOR SOLUTION ORAL
Qty: 0 | Refills: 0 | COMMUNITY
Start: 2018-05-02

## 2018-05-02 RX ORDER — OLMESARTAN MEDOXOMIL 5 MG/1
1 TABLET, FILM COATED ORAL
Qty: 0 | Refills: 0 | COMMUNITY

## 2018-05-02 RX ADMIN — Medication 3 MILLILITER(S): at 09:51

## 2018-05-02 RX ADMIN — Medication 30 MILLIGRAM(S): at 05:06

## 2018-05-02 RX ADMIN — Medication 0.5 MILLIGRAM(S): at 09:59

## 2018-05-02 RX ADMIN — Medication 3 MILLILITER(S): at 15:44

## 2018-05-02 RX ADMIN — CHLORHEXIDINE GLUCONATE 1 APPLICATION(S): 213 SOLUTION TOPICAL at 12:15

## 2018-05-02 RX ADMIN — Medication 50 MILLIGRAM(S): at 05:06

## 2018-05-02 NOTE — PROGRESS NOTE ADULT - PROVIDER SPECIALTY LIST ADULT
Infectious Disease
Internal Medicine
Nephrology
Pulmonology
Infectious Disease
Nephrology
Infectious Disease
Pulmonology
Internal Medicine
Internal Medicine

## 2018-05-02 NOTE — PROGRESS NOTE ADULT - PROBLEM SELECTOR PROBLEM 4
Hypophosphatemia
High cholesterol
Lung nodule
Lung nodule
Hypophosphatemia
Breast Cancer
High cholesterol
Hypophosphatemia
Lung nodule

## 2018-05-02 NOTE — PROGRESS NOTE ADULT - ASSESSMENT
89y Female with HTN, CAD s/p PCI, breast cancer on chemo a/e SOB and cough, found to have +RVP, managed with methylprednisolone/Nebs and off abx. Renal consult for POOJA.     POOJA (acute kidney injury) on CKD3, b/l Cr 1.5 (outpt PCP office)  Renal US without hydronephrosis, with pt with urinary retention.  Straight cathed 4/27- 700ml urine drained.   subsequent bladder US-no significant PVR.  off IVF  Stable hemodynamics and no identifiable nephrotoxins.   M.acidosis 2/2 POOJA-improving	    labs reviewed
89 y.o. woman with essential hypertension, CAD, lung cancer on chemotherapy now cough, fever, and malaise likely secondary to hMPV infection.
89F with probable metastatic breast cancer on chemotherapy regimen. Not neutropenic. Anemic and thrombocytopenic.Here with Bronchitis due to HMPV
89F with probable metastatic breast cancer on chemotherapy regimen. Not neutropenic. Anemic and thrombocytopenic.Here with Bronchitis due to HMPV  Also with delirium, improved.  Chest exam improved  WBC increased but seem better in all other respects
89 y.o. woman with essential hypertension, CAD, lung cancer on chemotherapy now cough, fever, and malaise likely secondary to hMPV infection.
89F with probable metastatic breast cancer on chemotherapy regimen. Not neutropenic. Anemic and thrombocytopenic.Here with Bronchitis due to HMPV  Also with delirium.
89y Female with HTN, CAD s/p PCI, breast cancer on chemo a/e SOB and cough, found to have +RVP, managed with methylprednisolone/Nebs and off abx. Renal consult for POOJA.     POOJA (acute kidney injury) on CKD3, b/l Cr 1.5 (outpt PCP office)  Renal US without hydronephrosis, with pt with urinary retention.  Straight cathed 4/27- 700ml urine drained.   subsequent bladder US-no significant PVR.  off IVF  Stable hemodynamics and no identifiable nephrotoxins.   M.acidosis 2/2 POOJA-improving	    labs reviewed
89 y.o. woman with essential hypertension, CAD, lung cancer on chemotherapy now cough, fever, and malaise likely secondary to hMPV infection.

## 2018-05-02 NOTE — PROGRESS NOTE ADULT - PROBLEM SELECTOR PLAN 5
- Low sodium diet  - continue with current treatment with antihypertensive regimen  - off Losartan due to POOJA.
Cont all medications per primary care:  4/28 defer
- Low sodium diet  - continue with current treatment with antihypertensive regimen  - off Losartan due to POOJA.
Cont all medications per primary care:  4/28 defer  4/29: cont current meds
Cont all medications per primary care:  4/28 defer  4/29: cont current meds  4/30: stable
Cont all medications per primary care:  4/28 defer  4/29: cont current meds  4/30: stable  5/1: stable
Cont all medications per primary care:  4/28 defer  4/29: cont current meds  4/30: stable  5/1: stable

## 2018-05-02 NOTE — PROGRESS NOTE ADULT - PROBLEM SELECTOR PROBLEM 5
Coronary artery disease involving native coronary artery of native heart without angina pectoris
Essential hypertension
Essential hypertension
Coronary artery disease involving native coronary artery of native heart without angina pectoris
Essential hypertension

## 2018-05-02 NOTE — DISCHARGE NOTE ADULT - CARE PROVIDER_API CALL
Rodrigo Garcias), Cardiology; Internal Medicine  [address] [place] [address]  Phone: [phone]  Fax: [phone]    Hakan Baltazar), Hematology; Internal Medicine; Medical Oncology  [address] [place] [address]  Phone: [phone]  Fax: [phone]

## 2018-05-02 NOTE — PROGRESS NOTE ADULT - PROBLEM SELECTOR PLAN 3
no e/o UTI  watch U/O, PVR periodically
Seems to be controlled.  4/28 stable
anemia  likely due to recent chemotherapy.  no melena/hematchezia.  monitor hgb.  mildly Iron deficient.
anemia  likely due to recent chemotherapy.  no melena/hematchezia.  monitor hgb.  Iron deficient.
no e/o UTI  watch U/O, PVR periodically
Due to above  Supportive care, decrease steroids as appropriate
per medicine.    Thank you for consulting us and involving us in the management of this most interesting and challenging case.     Please Call with any further questions
Seems to be controlled.  4/28 stable  4/29: controlled
Seems to be controlled.  4/28 stable  4/29: controlled  4/30: controlled
Seems to be controlled.  4/28 stable  4/29: controlled  4/30: controlled  5/1: stable
Seems to be controlled.  4/28 stable  4/29: controlled  4/30: controlled  5/1: stable
anemia  likely due to recent chemotherapy.  no melena/hematchezia.  monitor hgb.  Iron deficient.
no e/o UTI  watch U/O, PVR periodically
per medicine.
urinating  ok
urinating  ok

## 2018-05-02 NOTE — PROGRESS NOTE ADULT - PROBLEM SELECTOR PLAN 4
agree w/Kphos po x2 tabs  montior electrolytes daily
- Outpatient chemotherapy
defer to primary.  4/28 defer
- Outpatient chemotherapy
per medicine.
- Outpatient chemotherapy
better s/p Kphos po x2 tabs  montior electrolytes daily
defer to primary.  4/28 defer
resolved
resolved
resolved s/p Kphos po x2 tabs  montior electrolytes daily

## 2018-05-02 NOTE — DISCHARGE NOTE ADULT - SECONDARY DIAGNOSIS.
Lung nodule Hyperlipidemia Essential hypertension POOJA (acute kidney injury) Other iron deficiency anemia

## 2018-05-02 NOTE — PROGRESS NOTE ADULT - PROBLEM SELECTOR PLAN 1
Cr continues to trend down, cr dec to 1.46 today,   Azotemia partly 2/2 steroids, improving  check bmp in AM  bilateral crepts, likely due to Viral  broncholitis./pneumonia. no evidence of fluid overload

## 2018-05-02 NOTE — DISCHARGE NOTE ADULT - PLAN OF CARE
improved Continue steroids for 2 more days  Continue inhaled therapy  Continue supportive care Follow up with Dr. Matamoros after discharge from rehab for recommendations of continued care.  If not discharged from rehab within 3-4 weeks would follow up. Continue cholesterol control medications. Continue DASH diet. Follow up with your PCP within 1 week of discharge for further management and monitoring of lipid and cholesterol panels. Continue blood pressure medication regimen as directed. Monitor for any visual changes, headaches or dizziness.  Monitor blood pressure regularly.  Follow up with your PCP for further management for high blood pressure. Return to baseline Adequate hydration and continue medications Follow-up with your outpatient provider if further treatment is warranted. Monitor for signs/symptoms indicating worsening of disease, such as, easy bleeding/bruising, pale skin, fatigue, dizziness, increased heart rate, or chest pain. Continue steroids for 2 more days  Continue inhaled therapy Symbicort  Continue supportive care Benicar decreased to 20mg QD please continue along with beta-blocker. Home amlodipine discontinued follow-up with outpatient provider if blood pressure not controlled.   Continue blood pressure medication regimen as directed. Monitor for any visual changes, headaches or dizziness.  Monitor blood pressure regularly.  Follow up with your PCP for further management for high blood pressure.

## 2018-05-02 NOTE — CHART NOTE - NSCHARTNOTEFT_GEN_A_CORE
Spoke with Dr. Archuleta in regards to medication reconciliation and because blood pressure has been controlled the Amlodipine will continue to be held/discontinued and the ARB decreased to 20mg. Will continue Symbicort and discontinue duonebs. Pt discharged to rehab

## 2018-05-02 NOTE — DISCHARGE NOTE ADULT - PATIENT PORTAL LINK FT
You can access the [facility] Patient Portal, offered by [facility], by registering with the following website: [email or web address]

## 2018-05-02 NOTE — PROGRESS NOTE ADULT - PROBLEM SELECTOR PLAN 6
- Low cholesterol   - Continue with low intensity statin therapy
DVT prophylaxis.
- Low cholesterol   - Continue with low intensity statin therapy
DVT prophylaxis.

## 2018-05-02 NOTE — DISCHARGE NOTE ADULT - HOSPITAL COURSE
89 F with HTN, CAD, lung cancer on chemotherapy now cough, fever, and malaise likely secondary to hMPV infection.    Sepsis 2/2 multifocal pneumonia (hMPV) on CT 4/27 -> IV Solumedrol, DuoNebs, supplemental O2, and monitor off antibiotics     Lung nodule on CT - 2 solid-appearing nodules at the right lung base measuring approximate 7-8 mm may also reflect other foci of infection  - Repeat CT can be performed in 1-3 months. Nonspecific enlargement of a subcarinal lymph node up to 2.4 x 1.8 cm, new since prior PET/CT. Attention on follow-up.  - Outpatient chemotherapy     Essential hypertension - Amlodipine, BB, Losartan    High cholesterol and CAD - Statin    POOJA on CKD - US renal - 600 cc urinary retention     Dispo - GIACOMO

## 2018-05-02 NOTE — PROGRESS NOTE ADULT - PROBLEM SELECTOR PROBLEM 3
Retention, urine
Essential hypertension
Other iron deficiency anemia
Other iron deficiency anemia
Retention, urine
Breast Cancer
Bronchitis
Breast Cancer
Essential hypertension
Other iron deficiency anemia
Retention, urine

## 2018-05-02 NOTE — PROGRESS NOTE ADULT - PROBLEM SELECTOR PROBLEM 6
High cholesterol
Prophylactic measure
High cholesterol
Prophylactic measure

## 2018-05-02 NOTE — DISCHARGE NOTE ADULT - CARE PLAN
Principal Discharge DX:	Human metapneumovirus (hMPV) as cause of disease classified elsewhere  Goal:	improved  Assessment and plan of treatment:	Continue steroids for 2 more days  Continue inhaled therapy  Continue supportive care  Secondary Diagnosis:	Lung nodule  Assessment and plan of treatment:	Follow up with Dr. Matamoros after discharge from rehab for recommendations of continued care.  If not discharged from rehab within 3-4 weeks would follow up.  Secondary Diagnosis:	Hyperlipidemia  Assessment and plan of treatment:	Continue cholesterol control medications. Continue DASH diet. Follow up with your PCP within 1 week of discharge for further management and monitoring of lipid and cholesterol panels.  Secondary Diagnosis:	Essential hypertension  Assessment and plan of treatment:	Continue blood pressure medication regimen as directed. Monitor for any visual changes, headaches or dizziness.  Monitor blood pressure regularly.  Follow up with your PCP for further management for high blood pressure. Principal Discharge DX:	Human metapneumovirus (hMPV) as cause of disease classified elsewhere  Goal:	improved  Assessment and plan of treatment:	Continue steroids for 2 more days  Continue inhaled therapy  Continue supportive care  Secondary Diagnosis:	Lung nodule  Assessment and plan of treatment:	Follow up with Dr. Matamoros after discharge from rehab for recommendations of continued care.  If not discharged from rehab within 3-4 weeks would follow up.  Secondary Diagnosis:	Hyperlipidemia  Assessment and plan of treatment:	Continue cholesterol control medications. Continue DASH diet. Follow up with your PCP within 1 week of discharge for further management and monitoring of lipid and cholesterol panels.  Secondary Diagnosis:	Essential hypertension  Assessment and plan of treatment:	Continue blood pressure medication regimen as directed. Monitor for any visual changes, headaches or dizziness.  Monitor blood pressure regularly.  Follow up with your PCP for further management for high blood pressure.  Secondary Diagnosis:	POOJA (acute kidney injury)  Goal:	Return to baseline  Assessment and plan of treatment:	Adequate hydration and continue medications  Secondary Diagnosis:	Other iron deficiency anemia  Assessment and plan of treatment:	Follow-up with your outpatient provider if further treatment is warranted. Monitor for signs/symptoms indicating worsening of disease, such as, easy bleeding/bruising, pale skin, fatigue, dizziness, increased heart rate, or chest pain. Principal Discharge DX:	Human metapneumovirus (hMPV) as cause of disease classified elsewhere  Goal:	improved  Assessment and plan of treatment:	Continue steroids for 2 more days  Continue inhaled therapy Symbicort  Continue supportive care  Secondary Diagnosis:	Lung nodule  Assessment and plan of treatment:	Follow up with Dr. Matamoros after discharge from rehab for recommendations of continued care.  If not discharged from rehab within 3-4 weeks would follow up.  Secondary Diagnosis:	Hyperlipidemia  Assessment and plan of treatment:	Continue cholesterol control medications. Continue DASH diet. Follow up with your PCP within 1 week of discharge for further management and monitoring of lipid and cholesterol panels.  Secondary Diagnosis:	Essential hypertension  Assessment and plan of treatment:	Benicar decreased to 20mg QD please continue along with beta-blocker. Home amlodipine discontinued follow-up with outpatient provider if blood pressure not controlled.   Continue blood pressure medication regimen as directed. Monitor for any visual changes, headaches or dizziness.  Monitor blood pressure regularly.  Follow up with your PCP for further management for high blood pressure.  Secondary Diagnosis:	POOJA (acute kidney injury)  Goal:	Return to baseline  Assessment and plan of treatment:	Adequate hydration and continue medications  Secondary Diagnosis:	Other iron deficiency anemia  Assessment and plan of treatment:	Follow-up with your outpatient provider if further treatment is warranted. Monitor for signs/symptoms indicating worsening of disease, such as, easy bleeding/bruising, pale skin, fatigue, dizziness, increased heart rate, or chest pain.

## 2018-05-02 NOTE — PROGRESS NOTE ADULT - ATTENDING COMMENTS
Call if ID input needed over the weekend, Dr. Giorgi Chase is on call.    Osmel Meadows MD  [phone]
4/28: pt is not on any antibiotics: CT scan chest with multifocal pneumonia: Could be viral pneumonia: Being followed by ID : if there is slightest of indication of bacterial infection, can add antibiotics: THEO pCP
4/28: pt is not on any antibiotics: CT scan chest with multifocal pneumonia: Could be viral pneumonia: Being followed by ID : if there is slightest of indication of bacterial infection, can add antibiotics: THEO pCP  4/29: have been doing ok without antibiotics: Still with wheezing: cont steroids
4/28: pt is not on any antibiotics: CT scan chest with multifocal pneumonia: Could be viral pneumonia: Being followed by ID : if there is slightest of indication of bacterial infection, can add antibiotics: THEO pCP  4/29: have been doing ok without antibiotics: Still with wheezing: cont steroids  4/30; off antibiotics and stable:? viral pneumonia: Rpt ct scan chest in 8 weeks time with may be PET scan:
4/28: pt is not on any antibiotics: CT scan chest with multifocal pneumonia: Could be viral pneumonia: Being followed by ID : if there is slightest of indication of bacterial infection, can add antibiotics: THEO pCP  4/29: have been doing ok without antibiotics: Still with wheezing: cont steroids  4/30; off antibiotics and stable:? viral pneumonia: Rpt ct scan chest in 8 weeks time with may be PET scan:  5/1; remains stables of antibiotics:
4/28: pt is not on any antibiotics: CT scan chest with multifocal pneumonia: Could be viral pneumonia: Being followed by ID : if there is slightest of indication of bacterial infection, can add antibiotics: THEO pCP  4/29: have been doing ok without antibiotics: Still with wheezing: cont steroids  4/30; off antibiotics and stable:? viral pneumonia: Rpt ct scan chest in 8 weeks time with may be PET scan:  5/1; remains stables of antibiotics:  5/2: pt has  improved significantly: Doing much better:
Bc Stevens will be covering me starting 5/2/18. He can be reached at  if needed.     d/c planning to rehab. Await placement.     - Dr. SRINIVAS Cardona ([facility])  - [phone]
- Dr. SRINIVAS Cardona ([facility])  - [phone]
- Dr. SRINIVAS Cardona ([facility])  - [phone]
- Dr. SRINIVAS Cardona ([facility])  - [phone]
- Dr. SRINIVAS Cardona ([facility])  - [phone]
- Dr. SRINIVAS Cardona ([facility])  - [phone]

## 2018-05-02 NOTE — PROGRESS NOTE ADULT - PROBLEM SELECTOR PROBLEM 2
Acute renal failure, unspecified acute renal failure type
Lung nodule
Acute renal failure, unspecified acute renal failure type
HTN (Hypertension)
Bronchitis
Human metapneumovirus (hMPV) as cause of disease classified elsewhere
Lung nodule
Acute renal failure, unspecified acute renal failure type
Bronchitis
HTN (Hypertension)
Lung nodule
HTN (Hypertension)

## 2018-05-02 NOTE — PROGRESS NOTE ADULT - PROBLEM SELECTOR PLAN 2
s/p hypotension to 96/50  d/c Norvasc  c/w BB as tolerated by BP
Upon chart review, pt has CKD with baseline Cr 1.5 since 2013.   Acute on chronic renal failure.  Pre-renal vs may have post obstructive component.   s/p gentle IVF for 2-3 days. d/c ARB  US renal showing no hydro but retaining 600 cc of urine initially.   Straight cath done. Subsequent voiding trials were successful.   Renal. Dr. Lenz.   Cr. has much improved.   repeat post void residual 30 cc.  Able to void freely.
nodule not seen on it ? hsx oflung [place]: getting chemo: will folow with oncology after dc.  4/28 monitor now.
Upon chart review, pt has CKD with baseline Cr 1.5 since 2013.   Acute on chronic renal failure.  Pre-renal vs may have post obstructive component.   On gentle IVF. d/c ARB  US renal showing no hydro but retaining 600 cc of urine.   Straight cath, and check post void residual volume. May need alexander if not able to empty adequately.   Renal. Dr. Lenz.   monitor Cr  IVF per renal.
s/p hypotension 4/28. BP better now  off Norvasc  c/w BB as tolerated by BP
Supportive care  Contact precautions per protocol  Avoid abx with history of c diff
nodule not seen on it ? hsx oflung [place]: getting chemo: will folow with oncology after dc.  4/28 monitor now.  4/30: would need repeat ct scan chest in 6-8 weeks time: to ensure resolution the infiltrates:  5/2: must get rpt ct scan in 6 weeks time: follow up with her oncologist!
supportive care, decrease steroids as appropriate
Upon chart review, pt has CKD with baseline Cr 1.5 since 2013.   Acute on chronic renal failure.  Pre-renal vs may have post obstructive component.   s/p gentle IVF for 2-3 days. d/c ARB  US renal showing no hydro but retaining 600 cc of urine.   Straight cath done.  Renal. Dr. Lenz.   monitor Cr, already plateau and trending down.   repeat post void residual 30 cc.  able to void freely.
Upon chart review, pt has CKD with baseline Cr 1.5 since 2013.   Acute on chronic renal failure.  Pre-renal vs may have post obstructive component.   s/p gentle IVF for 2-3 days. d/c ARB  US renal showing no hydro but retaining 600 cc of urine.   Straight cath done.  Renal. Dr. Lenz.   monitor Cr, already plateau and trending down.   repeat post void residual 30 cc.  able to void freely.  Cr slowly improving.
Upon chart review, pt has CKD with baseline Cr 1.5 since 2013.   Acute on chronic renal failure.  Pre-renal vs may have post obstructive component.   s/p gentle IVF for 2-3 days. d/c ARB  US renal showing no hydro but retaining 600 cc of urine.   Straight cath done.  Renal. Dr. Lenz.   monitor Cr, already plateau and trending down.   repeat post void residual 30 cc.  able to void freely. monitor  Cr slowly improving.
baseline in 2015, Cr 1.15.  Pre-renal vs. ATN vs. AIN.  s/p gentle IVF.   d/c ARB  Cr trending up.   will consult renal. Dr. Lenz.   monitor Cr closely.   renal US
nodule not seen on it ? hsx oflung [place]: getting chemo: will folow with oncology after dc.  4/28 monitor now.  4/30: would need repeat ct scan chest in 6-8 weeks time: to ensure resolution the infilatrates:
nodule not seen on it ? hsx oflung [facility]: getting chemo: will folow with oncology after dc.  4/28 monitor now.  4/30: would need repeat ct scan chest in 6-8 weeks time: to ensure resolution the infiltrates:
nodule not seen on it ? hsx oflung [place]: getting chemo: will folow with oncology after dc.  4/28 monitor now.
s/p hypotension 4/28. BP better now  off Norvasc  c/w BB as tolerated by BP
s/p hypotension 4/28. BP better now  off Norvasc  c/w BB as tolerated by BP
s/p hypotension to 96/50 yesterday. BP better today  off Norvasc  c/w BB as tolerated by BP
supportive care, decrease steroids as appropriate

## 2018-05-02 NOTE — PROGRESS NOTE ADULT - PROBLEM SELECTOR PROBLEM 1
Sepsis, due to unspecified organism
Acute kidney injury superimposed on CKD
Human metapneumovirus (hMPV) as cause of disease classified elsewhere
Leukocytosis
Sepsis, due to unspecified organism
Acute kidney injury superimposed on CKD
Human metapneumovirus (hMPV) as cause of disease classified elsewhere
Sepsis, due to unspecified organism
Acute kidney injury superimposed on CKD
Sepsis, due to unspecified organism
Sepsis, due to unspecified organism

## 2018-05-02 NOTE — DISCHARGE NOTE ADULT - CARE PROVIDERS DIRECT ADDRESSES
,[email or web address]

## 2018-05-02 NOTE — PROGRESS NOTE ADULT - NSHPATTENDINGPLANDISCUSS_GEN_ALL_CORE
pt, RN, NP
pt
pt
pt, family bedside
pt and RAVEN Hawk
pt
pt and renal, NP Katina.
pt and renal, NP Katina.
pt, the son and the daughter in law at bedside. RAVEN Hawk

## 2018-05-02 NOTE — DISCHARGE NOTE ADULT - MEDICATION SUMMARY - MEDICATIONS TO TAKE
I will START or STAY ON the medications listed below when I get home from the hospital:    predniSONE 10 mg oral tablet  -- 3 tab(s) by mouth once a day for 2 more days through 5/4/18  -- Indication: For Human metapneumovirus (hMPV) as cause of disease classified elsewhere    budesonide 0.5 mg/2 mL inhalation suspension  -- 1 puff(s) inhaled 2 times a day  -- Indication: For Human metapneumovirus (hMPV) as cause of disease classified elsewhere    Benicar 20 mg oral tablet  -- 1 tab(s) by mouth once a day  -- Indication: For HTN (Hypertension)    Crestor 10 mg oral tablet  -- 1 tab(s) by mouth once a day (at bedtime)  -- Indication: For Hyperlipidemia    metoprolol succinate 50 mg oral tablet, extended release  -- 1 tab(s) by mouth once a day  -- Indication: For HTN (Hypertension)    polyethylene glycol 3350 oral powder for reconstitution  -- 17 gram(s) by mouth once a day (at bedtime), As needed, Constipation  -- Indication: For Constipation

## 2018-05-02 NOTE — CHART NOTE - NSCHARTNOTEFT_GEN_A_CORE
Patients family expressed concern regarding outpatient chemotherapy treatment.  Writer spoke with patients outpatient oncologist, Dr. Saturnino Alves, with consent from patient and family,  As per outpatient oncologist he will follow up with patient after discharge from rehab for continuance of chemotherapy.  Updated patient and family regarding plan.

## 2018-05-02 NOTE — PROGRESS NOTE ADULT - PROBLEM SELECTOR PLAN 1
pt with hMPV coronavirus infection with wheezing: For now cont iv steroids 20 q 12 h ours: and cont nebs with pulmicort:  Keep o2 sao2 above 88%: Venous PH 7.41.   4/28 stable. WBC trending down. lactate 1.4 as yesterday. stable vital signs  4/29: still wheezing: cont steroids : off antibiotics and remains stable  4/30: Doing ok : no wheezing today: change to po steroids for a short term  5/1: doing ok: remsin off antibiotics::  5/2: Symptomatically much better: cont current treatment: on po steroids now

## 2018-05-02 NOTE — PROGRESS NOTE ADULT - SUBJECTIVE AND OBJECTIVE BOX
[facility], Division of Infectious Diseases  CHRISTOPHER Mckinley A. Lee  [phone]    Name: VERONICA MADSEN  Age: 89y  Gender: Female  MRN: [id number]    Interval History--  Notes reviewed. Alert/oriented x2. Very worried about not taking aspirin.  Dry cough. No other complaints.    Past Medical History--  Osteoporosis  Constipation  Lung nodule  Corneal foreign body  High cholesterol  CAD (Coronary Artery Disease)  Breast Cancer  HTN (Hypertension)  S/P hysterectomy  Cataracts, bilateral  S/P Breast Lumpectomy  Coronary Stent      For details regarding the patient's social history, family history, and other miscellaneous elements, please refer the initial infectious diseases consultation and/or the admitting history and physical examination for this admission.    Allergies    No Known Allergies    Intolerances        Medications--  Antibiotics:    Immunologic:    Other:  acetaminophen   Tablet PRN  ALBUTerol/ipratropium for Nebulization  amLODIPine   Tablet  atorvastatin  buDESOnide   0.5 milliGRAM(s) Respule  methylPREDNISolone sodium succinate Injectable  metoprolol succinate ER      Review of Systems--  A 10-point review of systems was obtained.   Review of systems otherwise negative except as previously noted.    Physical Examination--  Vital Signs: T(F): 98.5 (04-27-18 @ 14:25), Max: 100.9 (04-26-18 @ 21:43)  HR: 101 (04-27-18 @ 14:25)  BP: 96/50 (04-27-18 @ 14:25)  RR: 18 (04-27-18 @ 14:25)  SpO2: 98% (04-27-18 @ 14:25)  Wt(kg): --  General: Nontoxic-appearing Female in no acute distress.  HEENT: AT/NC.  Corneal opacity L eye, hx corneal xplant with foreign body. Oror poor dentition grossly clear. Anicteric. Conj pink/moist  Neck: Not rigid. No sense of mass.  Chest: port R ACW C/D/I  Nodes: None palpable.  Lungs: diminished breath sounds bilaterally with less wheezing and rhonchi  Heart: Partially obscured. Grossly regular rate and rhythm. No Murmur. No rub. No gallop. No palpable thrill.  Abdomen: Bowel sounds present and normoactive. Soft. Nondistended. Nontender. No sense of mass. No organomegaly.  Extremities: No cyanosis or clubbing. No edema.   Skin: Warm. Dry. Good turgor. No rash. No vasculitic stigmata.  Psychiatric: More oriented. Anxious.          Laboratory Studies--  CBC                        8.5    16.56 )-----------( 104      ( 27 Apr 2018 07:09 )             27.7       Chemistries  04-27    146<H>  |  110<H>  |  39<H>  ----------------------------<  187<H>  3.0<L>   |  19<L>  |  2.31<H>    Ca    8.5      27 Apr 2018 07:09  Mg     2.2     04-26        Culture Data    Culture - Blood (collected 26 Apr 2018 09:56)  Source: BLOOD PERIPHERAL  Preliminary Report (27 Apr 2018 09:56):    NO ORGANISMS ISOLATED    NO ORGANISMS ISOLATED AT 24 HOURS    Culture - Blood (collected 26 Apr 2018 09:56)  Source: BLOOD  Preliminary Report (27 Apr 2018 09:56):    NO ORGANISMS ISOLATED    NO ORGANISMS ISOLATED AT 24 HOURS    Culture - Blood (collected 24 Apr 2018 19:07)  Source: BLOOD VENOUS  Preliminary Report (26 Apr 2018 19:07):    NO ORGANISMS ISOLATED    NO ORGANISMS ISOLATED AT 48 HRS.    Culture - Blood (collected 24 Apr 2018 19:07)  Source: BLOOD PERIPHERAL  Preliminary Report (26 Apr 2018 19:07):    NO ORGANISMS ISOLATED    NO ORGANISMS ISOLATED AT 48 HRS.
Patient is a 89y old  Female who presents with a chief complaint of cough and febrile (02 May 2018 12:31)      Any change in ROS: doingok : no SOB     MEDICATIONS  (STANDING):  ALBUTerol/ipratropium for Nebulization 3 milliLiter(s) Nebulizer every 6 hours  atorvastatin 20 milliGRAM(s) Oral at bedtime  buDESOnide   0.5 milliGRAM(s) Respule 0.5 milliGRAM(s) Inhalation two times a day  chlorhexidine 2% Cloths 1 Application(s) Topical daily  metoprolol succinate ER 50 milliGRAM(s) Oral daily  predniSONE   Tablet 30 milliGRAM(s) Oral daily    MEDICATIONS  (PRN):  acetaminophen   Tablet 650 milliGRAM(s) Oral every 6 hours PRN For Temp greater than 38 C (100.4 F) or mild pain  polyethylene glycol 3350 17 Gram(s) Oral at bedtime PRN Constipation    Vital Signs Last 24 Hrs  T(C): 36.8 (02 May 2018 12:31), Max: 36.8 (01 May 2018 21:20)  T(F): 98.3 (02 May 2018 12:31), Max: 98.3 (02 May 2018 12:31)  HR: 95 (02 May 2018 12:31) (76 - 95)  BP: 124/62 (02 May 2018 12:31) (118/66 - 124/62)  BP(mean): --  RR: 17 (02 May 2018 12:31) (17 - 18)  SpO2: 99% (02 May 2018 12:31) (95% - 99%)    I&O's Summary        Physical Exam:   GENERAL: NAD, well-groomed, well-developed  HEENT: LIN/   Atraumatic, Normocephalic  ENMT: No tonsillar erythema, exudates, or enlargement; Moist mucous membranes, Good dentition, No lesions  NECK: Supple, No JVD, Normal thyroid  CHEST/LUNG: bibasilar crackles++  CVS: Regular rate and rhythm; No murmurs, rubs, or gallops  GI: : Soft, Nontender, Nondistended; Bowel sounds present  NERVOUS SYSTEM:  Alert & Oriented X3, Good concentration; Motor Strength 5/5 B/L upper and lower extremities; DTRs 2+ intact and symmetric  EXTREMITIES:  2+ Peripheral Pulses, No clubbing, cyanosis, or edema  LYMPH: No lymphadenopathy noted  SKIN: No rashes or lesions  ENDOCRINOLOGY: No Thyromegaly  PSYCH: Appropriate    Labs:                              8.0    13.10 )-----------( 140      ( 02 May 2018 04:55 )             26.2                         8.8    14.72 )-----------( 143      ( 01 May 2018 12:45 )             28.7                         8.6    12.76 )-----------( 139      ( 30 Apr 2018 08:45 )             27.1                         7.3    10.90 )-----------( 121      ( 30 Apr 2018 04:30 )             23.9                         8.1    12.61 )-----------( 119      ( 29 Apr 2018 05:15 )             25.6     05-02    142  |  104  |  30<H>  ----------------------------<  97  4.1   |  29  |  1.46<H>  05-01    141  |  103  |  34<H>  ----------------------------<  169<H>  5.1   |  27  |  1.39<H>  04-30    143  |  108<H>  |  38<H>  ----------------------------<  117<H>  4.3   |  24  |  1.66<H>  04-29    144  |  108<H>  |  48<H>  ----------------------------<  196<H>  4.5   |  22  |  1.98<H>    Ca    8.7      02 May 2018 04:55  Ca    9.0      01 May 2018 12:45  Phos  2.6     05-02  Phos  2.9     05-01  Mg     1.8     05-02  Mg     2.0     05-01      CAPILLARY BLOOD GLUCOSE                Cultures:           Wound culture:                04-26 @ 09:56  Organism --  Culture w/ gram stain --  Specimen Source BLOOD      Abscess culture:             04-26 @ 09:56  Organism --  Gram Stain --  Specimen Source BLOOD        Tissue culture:           04-26 @ 09:56  Organism --  Gram Stain --  Specimen Source BLOOD      Body Fluid Smear & Culture:                        04-26 @ 09:56  AFB Smear  --  Culture Acid Fast Body Fluid w/ Smear  --  Culture Acid Fast Smear Concentrated   --    Culture Results:     --  Specimen Source BLOOD          < from: CT Chest No Cont (04.27.18 @ 08:56) >  MEDIASTINUM AND VERA: An enlarged subcarinal lymph node is seen measuring   2.4 x 1.8 cm. No anterior mediastinal lymphadenopathy or axillary or   supraclavicular lymphadenopathy noted.    CHEST WALL AND LOWER NECK: Within normal limits.    VISUALIZED UPPER ABDOMEN: Within normal limits.    BONES: Thoracic spondylosis. Noblastic or lytic lesions.    IMPRESSION:     Patchy areas of consolidation throughout both lungs suggestive of   multifocal pneumonia. 2 solid-appearing nodules at the right lung base   measuring approximate 7-8 mm may also reflect other foci of infection. If   there are more recent examinations since the PET/CT of 2015, they should   be submitted for comparison. In the absence of any outside imaging, a   repeat CT can be performed in one-3 months.    Nonspecific enlargement of a subcarinal lymphnode up to 2.4 x 1.8 cm,   new since prior PET/CT. Attention on follow-up.                    RAJ DUTTA M.D., RADIOLOGY RESIDENT  This document has been electronically signed.  KAYODE NEWTON M.D., ATTENDING RADIOLOGIST  This document has been electronically signed. Apr 27 2018  1:49PM    < end of copied text >      Studies  Chest X-RAY  CT SCAN Chest   Venous Dopplers: LE:   CT Abdomen  Others
infectious diseases progress note:    VERONICA MADSEN is a 89y y. o. Female patient    Patient reports: still feeling weak and not ready to go home    ROS:    EYES:  Negative  blurry vision or double vision  GASTROINTESTINAL:  Negative for nausea, vomiting, diarrhea  -otherwise negative except for subjective    Allergies    No Known Allergies    Intolerances        ANTIBIOTICS/RELEVANT:  antimicrobials    immunologic:    OTHER:  acetaminophen   Tablet 650 milliGRAM(s) Oral every 6 hours PRN  ALBUTerol/ipratropium for Nebulization 3 milliLiter(s) Nebulizer every 6 hours  atorvastatin 20 milliGRAM(s) Oral at bedtime  buDESOnide   0.5 milliGRAM(s) Respule 0.5 milliGRAM(s) Inhalation two times a day  chlorhexidine 2% Cloths 1 Application(s) Topical daily  docusate sodium 100 milliGRAM(s) Oral three times a day  methylPREDNISolone sodium succinate Injectable 20 milliGRAM(s) IV Push every 12 hours  metoprolol succinate ER 50 milliGRAM(s) Oral daily  polyethylene glycol 3350 17 Gram(s) Oral at bedtime PRN      Objective:  Last 24-Vital Signs Last 24 Hrs  T(C): 36.8 (30 Apr 2018 05:27), Max: 36.9 (29 Apr 2018 21:34)  T(F): 98.2 (30 Apr 2018 05:27), Max: 98.4 (29 Apr 2018 21:34)  HR: 82 (30 Apr 2018 09:44) (66 - 100)  BP: 105/52 (30 Apr 2018 05:27) (105/52 - 123/67)  BP(mean): --  RR: 18 (30 Apr 2018 05:27) (17 - 18)  SpO2: 97% (30 Apr 2018 09:44) (96% - 100%)    T(C): 36.8 (04-30-18 @ 05:27), Max: 36.9 (04-29-18 @ 21:34)  T(F): 98.2 (04-30-18 @ 05:27), Max: 98.4 (04-29-18 @ 21:34)  T(C): 36.8 (04-30-18 @ 05:27), Max: 36.9 (04-27-18 @ 14:25)  T(F): 98.2 (04-30-18 @ 05:27), Max: 98.5 (04-27-18 @ 14:25)  T(C): 36.8 (04-30-18 @ 05:27), Max: 38.3 (04-26-18 @ 21:43)  T(F): 98.2 (04-30-18 @ 05:27), Max: 100.9 (04-26-18 @ 21:43)    PHYSICAL EXAM:  Constitutional: Well-developed, well nourished  Eyes: PERRLA, EOMI  Ear/Nose/Throat: oropharynx normal	  Neck: no JVD, no lymphadenopathy, supple  Respiratory: no accessory muscle use, lung fields with few ronchi  Cardiovascular: distant  Gastrointestinal: soft, NT, no HSM, BS-normal  Extremities: no clubbing, no cyanosis, edema absent  Neuro: patient alert, oriented and appropriate  Skin: no sig lesions      LABS:                        8.6    12.76 )-----------( 139      ( 30 Apr 2018 08:45 )             27.1       WBC 12.76  04-30 @ 08:45  WBC 10.90  04-30 @ 04:30  WBC 12.61  04-29 @ 05:15  WBC 11.11  04-28 @ 05:15  WBC 16.56  04-27 @ 07:09  WBC 10.37  04-26 @ 07:05  WBC 7.89  04-25 @ 06:45  WBC 6.22  04-24 @ 18:15      04-30    143  |  108<H>  |  38<H>  ----------------------------<  117<H>  4.3   |  24  |  1.66<H>    Ca    8.4      30 Apr 2018 04:30  Phos  3.4     04-30  Mg     2.1     04-30        Creatinine, Serum: 1.66 mg/dL (04-30-18 @ 04:30)  Creatinine, Serum: 1.98 mg/dL (04-29-18 @ 05:15)  Creatinine, Serum: 2.16 mg/dL (04-28-18 @ 05:15)  Creatinine, Serum: 2.31 mg/dL (04-27-18 @ 07:09)  Creatinine, Serum: 2.07 mg/dL (04-26-18 @ 05:10)  Creatinine, Serum: 1.60 mg/dL (04-25-18 @ 06:45)  Creatinine, Serum: 1.59 mg/dL (04-24-18 @ 18:15)                MICROBIOLOGY:              RADIOLOGY & ADDITIONAL STUDIES:
Patient is a 89y old  Female who presents with a chief complaint of cough and febrile episode of several days in duration (2018 02:57)      SUBJECTIVE / OVERNIGHT EVENTS:  comfortable.  on nasal canula oxygen.   no cp, no sob, no n/v/d. no abdominal pain.  no headache, no dizziness.   +cough   "I feel a little better"      Vital Signs Last 24 Hrs  T(C): 37.6 (2018 11:21), Max: 39.4 (2018 21:33)  T(F): 99.7 (2018 11:21), Max: 102.9 (2018 21:33)  HR: 90 (2018 16:20) (88 - 111)  BP: 105/52 (2018 11:21) (96/56 - 139/69)  BP(mean): --  RR: 17 (2018 11:21) (17 - 19)  SpO2: 97% (2018 16:20) (95% - 97%)  I&O's Summary      PHYSICAL EXAM:  GENERAL: NAD, Comfortable  HEAD:  Atraumatic, Normocephalic  EYES: EOMI, PERRLA, conjunctiva and sclera clear  NECK: Supple, No JVD  CHEST/LUNG: coarse breath sounds bilaterally; No wheeze  HEART: Regular rate and rhythm; No murmurs, rubs, or gallops  ABDOMEN: Soft, Nontender, Nondistended; Bowel sounds present  Neuro: AAO x 2, no focal deficit, 5/5 b/l extremities  EXTREMITIES:  2+ Peripheral Pulses, No clubbing, cyanosis, or edema  SKIN: No rashes or lesions    LABS:                        8.9    10.37 )-----------( 82       ( 2018 07:05 )             29.1     04-26    145  |  110<H>  |  29<H>  ----------------------------<  190<H>  3.7   |  16<L>  |  2.07<H>    Ca    8.2<L>      2018 05:10  Phos  3.5     04-25  Mg     2.2     -    TPro  6.6  /  Alb  3.5  /  TBili  0.4  /  DBili  x   /  AST  27  /  ALT  18  /  AlkPhos  61  04-24      CAPILLARY BLOOD GLUCOSE            Urinalysis Basic - ( 2018 09:20 )    Color: YELLOW / Appearance: HAZY / S.015 / pH: 6.0  Gluc: NEGATIVE / Ketone: NEGATIVE  / Bili: NEGATIVE / Urobili: NORMAL mg/dL   Blood: NEGATIVE / Protein: 100 mg/dL / Nitrite: NEGATIVE   Leuk Esterase: NEGATIVE / RBC: 0-2 / WBC 2-5   Sq Epi: OCC / Non Sq Epi: x / Bacteria: x        RADIOLOGY & ADDITIONAL TESTS:    Imaging Personally Reviewed:  [x] YES  [ ] NO    Consultant(s) Notes Reviewed:  [x] YES  [ ] NO      MEDICATIONS  (STANDING):  ALBUTerol/ipratropium for Nebulization 3 milliLiter(s) Nebulizer every 6 hours  amLODIPine   Tablet 5 milliGRAM(s) Oral daily  atorvastatin 20 milliGRAM(s) Oral at bedtime  buDESOnide   0.5 milliGRAM(s) Respule 0.5 milliGRAM(s) Inhalation two times a day  methylPREDNISolone sodium succinate Injectable 20 milliGRAM(s) IV Push every 8 hours  metoprolol succinate ER 50 milliGRAM(s) Oral daily  sodium chloride 0.9%. 1000 milliLiter(s) (75 mL/Hr) IV Continuous <Continuous>    MEDICATIONS  (PRN):  acetaminophen   Tablet 650 milliGRAM(s) Oral every 6 hours PRN For Temp greater than 38 C (100.4 F) or mild pain      Care Discussed with Consultants/Other Providers [x] YES  [ ] NO    HEALTH ISSUES - PROBLEM Dx:  Breast Cancer: Breast Cancer  Bronchitis: Bronchitis  Human metapneumovirus (hMPV) as cause of disease classified elsewhere: Human metapneumovirus (hMPV) as cause of disease classified elsewhere  Prophylactic measure: Prophylactic measure  Coronary artery disease involving native coronary artery of native heart without angina pectoris: Coronary artery disease involving native coronary artery of native heart without angina pectoris  High cholesterol: High cholesterol  Essential hypertension: Essential hypertension  Lung nodule: Lung nodule  Sepsis, due to unspecified organism: Sepsis, due to unspecified organism
Patient is a 89y old  Female who presents with a chief complaint of cough and febrile episode of several days in duration (25 Apr 2018 02:57)      Any change in ROS: "I feel ok"  no OSB : no cough     MEDICATIONS  (STANDING):  ALBUTerol/ipratropium for Nebulization 3 milliLiter(s) Nebulizer every 6 hours  atorvastatin 20 milliGRAM(s) Oral at bedtime  buDESOnide   0.5 milliGRAM(s) Respule 0.5 milliGRAM(s) Inhalation two times a day  chlorhexidine 2% Cloths 1 Application(s) Topical daily  docusate sodium 100 milliGRAM(s) Oral three times a day  methylPREDNISolone sodium succinate Injectable 20 milliGRAM(s) IV Push every 12 hours  metoprolol succinate ER 50 milliGRAM(s) Oral daily    MEDICATIONS  (PRN):  acetaminophen   Tablet 650 milliGRAM(s) Oral every 6 hours PRN For Temp greater than 38 C (100.4 F) or mild pain  polyethylene glycol 3350 17 Gram(s) Oral at bedtime PRN Constipation    Vital Signs Last 24 Hrs  T(C): 36.8 (30 Apr 2018 05:27), Max: 36.9 (29 Apr 2018 21:34)  T(F): 98.2 (30 Apr 2018 05:27), Max: 98.4 (29 Apr 2018 21:34)  HR: 82 (30 Apr 2018 09:44) (66 - 100)  BP: 105/52 (30 Apr 2018 05:27) (105/52 - 123/67)  BP(mean): --  RR: 18 (30 Apr 2018 05:27) (17 - 18)  SpO2: 97% (30 Apr 2018 09:44) (96% - 100%)    I&O's Summary    29 Apr 2018 07:01  -  30 Apr 2018 07:00  --------------------------------------------------------  IN: 0 mL / OUT: 470 mL / NET: -470 mL          Physical Exam:   GENERAL: NAD, well-groomed, well-developed  HEENT: LIN/   Atraumatic, Normocephalic  ENMT: No tonsillar erythema, exudates, or enlargement; Moist mucous membranes, Good dentition, No lesions  NECK: Supple, No JVD, Normal thyroid  CHEST/LUNG: Clear to auscultaion  CVS: Regular rate and rhythm; No murmurs, rubs, or gallops  GI: : Soft, Nontender, Nondistended; Bowel sounds present  NERVOUS SYSTEM:  Alert & Oriented X3, Good concentration; Motor Strength 5/5 B/L upper and lower extremities; DTRs 2+ intact and symmetric  EXTREMITIES:  2+ Peripheral Pulses, No clubbing, cyanosis, or edema  LYMPH: No lymphadenopathy noted  SKIN: No rashes or lesions  ENDOCRINOLOGY: No Thyromegaly  PSYCH: Appropriate    Labs:  23                            8.6    12.76 )-----------( 139      ( 30 Apr 2018 08:45 )             27.1                         7.3    10.90 )-----------( 121      ( 30 Apr 2018 04:30 )             23.9                         8.1    12.61 )-----------( 119      ( 29 Apr 2018 05:15 )             25.6                         10.4   11.11 )-----------( 95       ( 28 Apr 2018 05:15 )             33.5                         8.5    16.56 )-----------( 104      ( 27 Apr 2018 07:09 )             27.7     04-30    143  |  108<H>  |  38<H>  ----------------------------<  117<H>  4.3   |  24  |  1.66<H>  04-29    144  |  108<H>  |  48<H>  ----------------------------<  196<H>  4.5   |  22  |  1.98<H>  04-28    144  |  110<H>  |  46<H>  ----------------------------<  175<H>  4.3   |  21<L>  |  2.16<H>  04-27    146<H>  |  110<H>  |  39<H>  ----------------------------<  187<H>  3.0<L>   |  19<L>  |  2.31<H>    Ca    8.4      30 Apr 2018 04:30  Ca    8.4      29 Apr 2018 05:15  Phos  3.4     04-30  Phos  2.7     04-29  Mg     2.1     04-30  Mg     2.3     04-29      CAPILLARY BLOOD GLUCOSE                Lactate, Blood: 1.4 mmol/L (04-27 @ 07:09)  Cultures:           Wound culture:                04-26 @ 09:56  Organism --  Culture w/ gram stain --  Specimen Source BLOOD    Wound culture:                04-24 @ 19:07  Organism --  Culture w/ gram stain --  Specimen Source BLOOD PERIPHERAL      Abscess culture:             04-26 @ 09:56  Organism --  Gram Stain --  Specimen Source BLOOD    Abscess culture:             04-24 @ 19:07  Organism --  Gram Stain --  Specimen Source BLOOD PERIPHERAL        Tissue culture:           04-26 @ 09:56  Organism --  Gram Stain --  Specimen Source BLOOD    Tissue culture:           04-24 @ 19:07  Organism --  Gram Stain --  Specimen Source BLOOD PERIPHERAL      Body Fluid Smear & Culture:                        04-26 @ 09:56  AFB Smear  --  Culture Acid Fast Body Fluid w/ Smear  --  Culture Acid Fast Smear Concentrated   --    Culture Results:     --  Specimen Source BLOOD    Body Fluid Smear & Culture:                        04-24 @ 19:07  AFB Smear  --  Culture Acid Fast Body Fluid w/ Smear  --  Culture Acid Fast Smear Concentrated   --    Culture Results:     --  Specimen Source BLOOD PERIPHERAL        Rapid Respiratory Viral Panel Result        04-24 @ 19:00  Rapid RVP --  Coronovirus --  Adenovirus NOT DETECTED  Bordetella Pertussis NOT DETECTED  Chlamydia Pneumonia NOT DETECTED  Entero/RhinovirusNOT DETECTED  HKU1 Coronovirus --  HMPV Coronovirus POSITIVE  Influenza A NOT DETECTED (any subtype)  Influenza AH1 NOT DETECTED  Influenza AH1 2009 NOT DETECTED  Influenza AH3 NOT DETECTED  Influenza B NOT DETECTED  Mycoplasma Pneumoniae NOT DETECTED This nucleic acid amplification assay was performed using  the [employer]. The following pathogens are tested  for: Adenovirus, Coronavirus 229E, Coronavirus HKU1,  Coronavirus NL63, Coronavirus OC43, Human Metapneumovirus  (HMPV), Rhinovirus/Enterovirus, Influenza A H1, Influenza A  H1 2009 (Pandemic H1 2009), Influenza A H3, Influenza A (Flu  A) subtype not identified, Influenza B, Parainfluenza Virus  (types 1, 2, 3, 4), Respiratory Syncytial Virus (RSV),  Bordetella pertussis, Chlamydophila pneumoniae, and  Mycoplasma pneumoniae. A negative FilmArray result does not  always exclude the possibility of viral or bacterial  infection. Laboratory results should always be interpreted  in the context of clinical findings.  NL63 Coronovirus --  OC43 Coronovirus --  Parainfluenza 1 NOT DETECTED  Parainfluenza 2 NOT DETECTED  Parainfluenza 3 NOT DETECTED  Parainfluenza 4 NOT DETECTED  Resp Syncytial Virus NOT DETECTED      < from: CT Chest No Cont (04.27.18 @ 08:56) >  2.4 x 1.8 cm. No anterior mediastinal lymphadenopathy or axillary or   supraclavicular lymphadenopathy noted.    CHEST WALL AND LOWER NECK: Within normal limits.    VISUALIZED UPPER ABDOMEN: Within normal limits.    BONES: Thoracic spondylosis. Noblastic or lytic lesions.    IMPRESSION:     Patchy areas of consolidation throughout both lungs suggestive of   multifocal pneumonia. 2 solid-appearing nodules at the right lung base   measuring approximate 7-8 mm may also reflect other foci of infection. If   there are more recent examinations since the PET/CT of 2015, they should   be submitted for comparison. In the absence of any outside imaging, a   repeat CT can be performed in one-3 months.    Nonspecific enlargement of a subcarinal lymphnode up to 2.4 x 1.8 cm,   new since prior PET/CT. Attention on follow-up.                    RAJ DUTTA M.D., RADIOLOGY RESIDENT  This document has been electronically signed.  KAYODE NEWTON M.D., ATTENDING RADIOLOGIST  This document has been electronically signed. Apr 27 2018  1:49PM        < end of copied text >      Studies  Chest X-RAY  CT SCAN Chest   Venous Dopplers: LE:   CT Abdomen  Others
Patient is a 89y old  Female who presents with a chief complaint of cough and febrile episode of several days in duration (25 Apr 2018 02:57)      Any change in ROS: pt is doing ok : no SOB     MEDICATIONS  (STANDING):  ALBUTerol/ipratropium for Nebulization 3 milliLiter(s) Nebulizer every 6 hours  atorvastatin 20 milliGRAM(s) Oral at bedtime  buDESOnide   0.5 milliGRAM(s) Respule 0.5 milliGRAM(s) Inhalation two times a day  chlorhexidine 2% Cloths 1 Application(s) Topical daily  metoprolol succinate ER 50 milliGRAM(s) Oral daily  predniSONE   Tablet 30 milliGRAM(s) Oral daily    MEDICATIONS  (PRN):  acetaminophen   Tablet 650 milliGRAM(s) Oral every 6 hours PRN For Temp greater than 38 C (100.4 F) or mild pain  polyethylene glycol 3350 17 Gram(s) Oral at bedtime PRN Constipation    Vital Signs Last 24 Hrs  T(C): 36.6 (01 May 2018 06:20), Max: 36.7 (30 Apr 2018 22:38)  T(F): 97.8 (01 May 2018 06:20), Max: 98.1 (30 Apr 2018 22:38)  HR: 90 (01 May 2018 12:20) (90 - 117)  BP: 114/63 (01 May 2018 06:20) (114/63 - 124/53)  BP(mean): --  RR: 18 (01 May 2018 06:20) (18 - 18)  SpO2: 98% (01 May 2018 12:20) (98% - 100%)    I&O's Summary        Physical Exam:   GENERAL: NAD, well-groomed, well-developed  HEENT: LIN/   Atraumatic, Normocephalic  ENMT: No tonsillar erythema, exudates, or enlargement; Moist mucous membranes, Good dentition, No lesions  NECK: Supple, No JVD, Normal thyroid  CHEST/LUNG: Crackles at bilateral bases  CVS: Regular rate and rhythm; No murmurs, rubs, or gallops  GI: : Soft, Nontender, Nondistended; Bowel sounds present  NERVOUS SYSTEM:  Alert & Oriented X3, Good concentration; Motor Strength 5/5 B/L upper and lower extremities; DTRs 2+ intact and symmetric  EXTREMITIES:  2+ Peripheral Pulses, No clubbing, cyanosis, or edema  LYMPH: No lymphadenopathy noted  SKIN: No rashes or lesions  ENDOCRINOLOGY: No Thyromegaly  PSYCH: Appropriate    Labs:  23                            8.6    12.76 )-----------( 139      ( 30 Apr 2018 08:45 )             27.1                         7.3    10.90 )-----------( 121      ( 30 Apr 2018 04:30 )             23.9                         8.1    12.61 )-----------( 119      ( 29 Apr 2018 05:15 )             25.6                         10.4   11.11 )-----------( 95       ( 28 Apr 2018 05:15 )             33.5     04-30    143  |  108<H>  |  38<H>  ----------------------------<  117<H>  4.3   |  24  |  1.66<H>  04-29    144  |  108<H>  |  48<H>  ----------------------------<  196<H>  4.5   |  22  |  1.98<H>  04-28    144  |  110<H>  |  46<H>  ----------------------------<  175<H>  4.3   |  21<L>  |  2.16<H>    Ca    8.4      30 Apr 2018 04:30  Phos  3.4     04-30  Mg     2.1     04-30      CAPILLARY BLOOD GLUCOSE          < from: CT Chest No Cont (04.27.18 @ 08:56) >  artifact. No gross endobronchial lesions.  PLEURA: No pleural effusion.    VESSELS:     Coronary and aortic atherosclerosis.    HEART: Heart size is normal. No pericardial effusion. Mitral annular   calcifications noted    MEDIASTINUM AND VERA: An enlarged subcarinal lymph node is seen measuring   2.4 x 1.8 cm. No anterior mediastinal lymphadenopathy or axillary or   supraclavicular lymphadenopathy noted.    CHEST WALL AND LOWER NECK: Within normal limits.    VISUALIZED UPPER ABDOMEN: Within normal limits.    BONES: Thoracic spondylosis. Noblastic or lytic lesions.    IMPRESSION:     Patchy areas of consolidation throughout both lungs suggestive of   multifocal pneumonia. 2 solid-appearing nodules at the right lung base   measuring approximate 7-8 mm may also reflect other foci of infection. If   there are more recent examinations since the PET/CT of 2015, they should   be submitted for comparison. In the absence of any outside imaging, a   repeat CT can be performed in one-3 months.    Nonspecific enlargement of a subcarinal lymphnode up to 2.4 x 1.8 cm,   new since prior PET/CT. Attention on follow-up.                    RAJ DUTTA M.D., RADIOLOGY RESIDENT  This document has been electronically signed.  KAYODE NEWTON M.D., ATTENDING RADIOLOGIST  This document has been electronically signed. Apr 27 2018  1:49PM    < end of copied text >        Cultures:           Wound culture:                04-26 @ 09:56  Organism --  Culture w/ gram stain --  Specimen Source BLOOD    Wound culture:                04-24 @ 19:07  Organism --  Culture w/ gram stain --  Specimen Source BLOOD PERIPHERAL      Abscess culture:             04-26 @ 09:56  Organism --  Gram Stain --  Specimen Source BLOOD    Abscess culture:             04-24 @ 19:07  Organism --  Gram Stain --  Specimen Source BLOOD PERIPHERAL        Tissue culture:           04-26 @ 09:56  Organism --  Gram Stain --  Specimen Source BLOOD    Tissue culture:           04-24 @ 19:07  Organism --  Gram Stain --  Specimen Source BLOOD PERIPHERAL      Body Fluid Smear & Culture:                        04-26 @ 09:56  AFB Smear  --  Culture Acid Fast Body Fluid w/ Smear  --  Culture Acid Fast Smear Concentrated   --    Culture Results:     --  Specimen Source BLOOD    Body Fluid Smear & Culture:                        04-24 @ 19:07  AFB Smear  --  Culture Acid Fast Body Fluid w/ Smear  --  Culture Acid Fast Smear Concentrated   --    Culture Results:     --  Specimen Source BLOOD PERIPHERAL        Rapid Respiratory Viral Panel Result        04-24 @ 19:00  Rapid RVP --  Coronovirus --  Adenovirus NOT DETECTED  Bordetella Pertussis NOT DETECTED  Chlamydia Pneumonia NOT DETECTED  Entero/RhinovirusNOT DETECTED  HKU1 Coronovirus --  HMPV Coronovirus POSITIVE  Influenza A NOT DETECTED (any subtype)  Influenza AH1 NOT DETECTED  Influenza AH1 2009 NOT DETECTED  Influenza AH3 NOT DETECTED  Influenza B NOT DETECTED  Mycoplasma Pneumoniae NOT DETECTED This nucleic acid amplification assay was performed using  the [facility]. The following pathogens are tested  for: Adenovirus, Coronavirus 229E, Coronavirus HKU1,  Coronavirus NL63, Coronavirus OC43, Human Metapneumovirus  (HMPV), Rhinovirus/Enterovirus, Influenza A H1, Influenza A  H1 2009 (Pandemic H1 2009), Influenza A H3, Influenza A (Flu  A) subtype not identified, Influenza B, Parainfluenza Virus  (types 1, 2, 3, 4), Respiratory Syncytial Virus (RSV),  Bordetella pertussis, Chlamydophila pneumoniae, and  Mycoplasma pneumoniae. A negative FilmArray result does not  always exclude the possibility of viral or bacterial  infection. Laboratory results should always be interpreted  in the context of clinical findings.  NL63 Coronovirus --  OC43 Coronovirus --  Parainfluenza 1 NOT DETECTED  Parainfluenza 2 NOT DETECTED  Parainfluenza 3 NOT DETECTED  Parainfluenza 4 NOT DETECTED  Resp Syncytial Virus NOT DETECTED          Studies  Chest X-RAY  CT SCAN Chest   Venous Dopplers: LE:   CT Abdomen  Others
Patient is a 89y old  Female who presents with a chief complaint of cough and febrile episode of several days in duration (25 Apr 2018 02:57)      Any change in ROS: pt looks better:  no SOB : no cough     MEDICATIONS  (STANDING):  ALBUTerol/ipratropium for Nebulization 3 milliLiter(s) Nebulizer every 6 hours  atorvastatin 20 milliGRAM(s) Oral at bedtime  buDESOnide   0.5 milliGRAM(s) Respule 0.5 milliGRAM(s) Inhalation two times a day  chlorhexidine 2% Cloths 1 Application(s) Topical daily  docusate sodium 100 milliGRAM(s) Oral three times a day  methylPREDNISolone sodium succinate Injectable 20 milliGRAM(s) IV Push every 12 hours  metoprolol succinate ER 50 milliGRAM(s) Oral daily    MEDICATIONS  (PRN):  acetaminophen   Tablet 650 milliGRAM(s) Oral every 6 hours PRN For Temp greater than 38 C (100.4 F) or mild pain  polyethylene glycol 3350 17 Gram(s) Oral at bedtime PRN Constipation    Vital Signs Last 24 Hrs  T(C): 36.6 (29 Apr 2018 12:47), Max: 36.7 (29 Apr 2018 04:54)  T(F): 97.9 (29 Apr 2018 12:47), Max: 98.1 (29 Apr 2018 04:54)  HR: 100 (29 Apr 2018 12:47) (62 - 106)  BP: 119/56 (29 Apr 2018 12:47) (96/50 - 119/56)  BP(mean): --  RR: 18 (29 Apr 2018 12:47) (17 - 18)  SpO2: 100% (29 Apr 2018 12:47) (95% - 100%)    I&O's Summary    28 Apr 2018 07:01  -  29 Apr 2018 07:00  --------------------------------------------------------  IN: 0 mL / OUT: 870 mL / NET: -870 mL          Physical Exam:   GENERAL: NAD, well-groomed, well-developed  HEENT: LIN/   Atraumatic, Normocephalic  ENMT: No tonsillar erythema, exudates, or enlargement; Moist mucous membranes, Good dentition, No lesions  NECK: Supple, No JVD, Normal thyroid  CHEST/LUNG: wheezing+  CVS: Regular rate and rhythm; No murmurs, rubs, or gallops  GI: : Soft, Nontender, Nondistended; Bowel sounds present  NERVOUS SYSTEM:  Alert & Oriented X3, Good concentration; Motor Strength 5/5 B/L upper and lower extremities; DTRs 2+ intact and symmetric  EXTREMITIES:  2+ Peripheral Pulses, No clubbing, cyanosis, or edema  LYMPH: No lymphadenopathy noted  SKIN: No rashes or lesions  ENDOCRINOLOGY: No Thyromegaly  PSYCH: Appropriate    Labs:  23                            8.1    12.61 )-----------( 119      ( 29 Apr 2018 05:15 )             25.6                         10.4   11.11 )-----------( 95       ( 28 Apr 2018 05:15 )             33.5                         8.5    16.56 )-----------( 104      ( 27 Apr 2018 07:09 )             27.7                         8.9    10.37 )-----------( 82       ( 26 Apr 2018 07:05 )             29.1     04-29    144  |  108<H>  |  48<H>  ----------------------------<  196<H>  4.5   |  22  |  1.98<H>  04-28    144  |  110<H>  |  46<H>  ----------------------------<  175<H>  4.3   |  21<L>  |  2.16<H>  04-27    146<H>  |  110<H>  |  39<H>  ----------------------------<  187<H>  3.0<L>   |  19<L>  |  2.31<H>  04-26    145  |  110<H>  |  29<H>  ----------------------------<  190<H>  3.7   |  16<L>  |  2.07<H>    Ca    8.4      29 Apr 2018 05:15  Ca    8.9      28 Apr 2018 05:15  Phos  2.7     04-29  Phos  2.4     04-28  Mg     2.3     04-29  Mg     2.4     04-28      CAPILLARY BLOOD GLUCOSE                Lactate, Blood: 1.4 mmol/L (04-27 @ 07:09)  Cultures:           Wound culture:                04-26 @ 09:56  Organism --  Culture w/ gram stain --  Specimen Source BLOOD    Wound culture:                04-24 @ 19:07  Organism --  Culture w/ gram stain --  Specimen Source BLOOD PERIPHERAL      Abscess culture:             04-26 @ 09:56  Organism --  Gram Stain --  Specimen Source BLOOD    Abscess culture:             04-24 @ 19:07  Organism --  Gram Stain --  Specimen Source BLOOD PERIPHERAL        Tissue culture:           04-26 @ 09:56  Organism --  Gram Stain --  Specimen Source BLOOD    Tissue culture:           04-24 @ 19:07  Organism --  Gram Stain --  Specimen Source BLOOD PERIPHERAL      Body Fluid Smear & Culture:                        04-26 @ 09:56  AFB Smear  --  Culture Acid Fast Body Fluid w/ Smear  --  Culture Acid Fast Smear Concentrated   --    Culture Results:     --  Specimen Source BLOOD    Body Fluid Smear & Culture:                        04-24 @ 19:07  AFB Smear  --  Culture Acid Fast Body Fluid w/ Smear  --  Culture Acid Fast Smear Concentrated   --    Culture Results:     --  Specimen Source BLOOD PERIPHERAL        Rapid Respiratory Viral Panel Result        04-24 @ 19:00  Rapid RVP --  Coronovirus --  Adenovirus NOT DETECTED  Bordetella Pertussis NOT DETECTED  Chlamydia Pneumonia NOT DETECTED  Entero/RhinovirusNOT DETECTED  HKU1 Coronovirus --  HMPV Coronovirus POSITIVE  Influenza A NOT DETECTED (any subtype)  Influenza AH1 NOT DETECTED  Influenza AH1 2009 NOT DETECTED  Influenza AH3 NOT DETECTED  Influenza B NOT DETECTED  Mycoplasma Pneumoniae NOT DETECTED This nucleic acid amplification assay was performed using  the [employer]. The following pathogens are tested  for: Adenovirus, Coronavirus 229E, Coronavirus HKU1,  Coronavirus NL63, Coronavirus OC43, Human Metapneumovirus  (HMPV), Rhinovirus/Enterovirus, Influenza A H1, Influenza A  H1 2009 (Pandemic H1 2009), Influenza A H3, Influenza A (Flu  A) subtype not identified, Influenza B, Parainfluenza Virus  (types 1, 2, 3, 4), Respiratory Syncytial Virus (RSV),  Bordetella pertussis, Chlamydophila pneumoniae, and  Mycoplasma pneumoniae. A negative FilmArray result does not  always exclude the possibility of viral or bacterial  infection. Laboratory results should always be interpreted  in the context of clinical findings.  NL63 Coronovirus --  OC43 Coronovirus --  Parainfluenza 1 NOT DETECTED  Parainfluenza 2 NOT DETECTED  Parainfluenza 3 NOT DETECTED  Parainfluenza 4 NOT DETECTED  Resp Syncytial Virus NOT DETECTED          Studies  Chest X-RAY  CT SCAN Chest   Venous Dopplers: LE:   CT Abdomen  Others      < from: CT Chest No Cont (04.27.18 @ 08:56) >  ALL AND LOWER NECK: Within normal limits.    VISUALIZED UPPER ABDOMEN: Within normal limits.    BONES: Thoracic spondylosis. Noblastic or lytic lesions.    IMPRESSION:     Patchy areas of consolidation throughout both lungs suggestive of   multifocal pneumonia. 2 solid-appearing nodules at the right lung base   measuring approximate 7-8 mm may also reflect other foci of infection. If   there are more recent examinations since the PET/CT of 2015, they should   be submitted for comparison. In the absence of any outside imaging, a   repeat CT can be performed in one-3 months.    Nonspecific enlargement of a subcarinal lymphnode up to 2.4 x 1.8 cm,   new since prior PET/CT. Attention on follow-up.                    RAJ DUTTA M.D., RADIOLOGY RESIDENT  This document has been electronically signed.  KAYODE NEWTON M.D., ATTENDING RADIOLOGIST  This document has been electronically signed. Apr 27 2018  1:49PM        < end of copied text >
Patient is a 89y old  Female who presents with a chief complaint of cough and febrile episode of several days in duration (25 Apr 2018 02:57)      SUBJECTIVE / OVERNIGHT EVENTS:  No events.  Feel well. still coughing but much better.   no complaints. the son and the daughter in law at bedside.   no cp, no sob, no n/v/d.  no abd pain.   Cr getting better.       Vital Signs Last 24 Hrs  T(C): 36.8 (30 Apr 2018 05:27), Max: 36.9 (29 Apr 2018 21:34)  T(F): 98.2 (30 Apr 2018 05:27), Max: 98.4 (29 Apr 2018 21:34)  HR: 82 (30 Apr 2018 09:44) (66 - 100)  BP: 105/52 (30 Apr 2018 05:27) (105/52 - 123/67)  BP(mean): --  RR: 18 (30 Apr 2018 05:27) (17 - 18)  SpO2: 97% (30 Apr 2018 09:44) (96% - 100%)  I&O's Summary    29 Apr 2018 07:01  -  30 Apr 2018 07:00  --------------------------------------------------------  IN: 0 mL / OUT: 470 mL / NET: -470 mL        PHYSICAL EXAM:  GENERAL: NAD, Comfortable  HEAD:  Atraumatic, Normocephalic  EYES: EOMI, PERRLA, conjunctiva and sclera clear  NECK: Supple, No JVD  CHEST/LUNG: coarse breath sounds bilaterally, much improved; mild wheeze  HEART: Regular rate and rhythm; No murmurs, rubs, or gallops  ABDOMEN: Soft, Nontender, minimally distended; Bowel sounds present  Neuro: AAO x 3, no focal deficit, 5/5 b/l extremities  EXTREMITIES:  2+ Peripheral Pulses, No clubbing, cyanosis, or edema  SKIN: No rashes or lesions      LABS:                        8.6    12.76 )-----------( 139      ( 30 Apr 2018 08:45 )             27.1     04-30    143  |  108<H>  |  38<H>  ----------------------------<  117<H>  4.3   |  24  |  1.66<H>    Ca    8.4      30 Apr 2018 04:30  Phos  3.4     04-30  Mg     2.1     04-30        CAPILLARY BLOOD GLUCOSE                RADIOLOGY & ADDITIONAL TESTS:    Imaging Personally Reviewed:  [x] YES  [ ] NO    Consultant(s) Notes Reviewed:  [x] YES  [ ] NO      MEDICATIONS  (STANDING):  ALBUTerol/ipratropium for Nebulization 3 milliLiter(s) Nebulizer every 6 hours  atorvastatin 20 milliGRAM(s) Oral at bedtime  buDESOnide   0.5 milliGRAM(s) Respule 0.5 milliGRAM(s) Inhalation two times a day  chlorhexidine 2% Cloths 1 Application(s) Topical daily  docusate sodium 100 milliGRAM(s) Oral three times a day  methylPREDNISolone sodium succinate Injectable 20 milliGRAM(s) IV Push every 12 hours  metoprolol succinate ER 50 milliGRAM(s) Oral daily    MEDICATIONS  (PRN):  acetaminophen   Tablet 650 milliGRAM(s) Oral every 6 hours PRN For Temp greater than 38 C (100.4 F) or mild pain  polyethylene glycol 3350 17 Gram(s) Oral at bedtime PRN Constipation      Care Discussed with Consultants/Other Providers [x] YES  [ ] NO    HEALTH ISSUES - PROBLEM Dx:  Hypophosphatemia: Hypophosphatemia  Retention, urine: Retention, urine  HTN (Hypertension): HTN (Hypertension)  Acute kidney injury superimposed on CKD: Acute kidney injury superimposed on CKD  Leukocytosis: Leukocytosis  POOJA (acute kidney injury): POOJA (acute kidney injury)  Other iron deficiency anemia: Other iron deficiency anemia  Acute renal failure, unspecified acute renal failure type: Acute renal failure, unspecified acute renal failure type  Breast Cancer: Breast Cancer  Bronchitis: Bronchitis  Human metapneumovirus (hMPV) as cause of disease classified elsewhere: Human metapneumovirus (hMPV) as cause of disease classified elsewhere  Prophylactic measure: Prophylactic measure  Coronary artery disease involving native coronary artery of native heart without angina pectoris: Coronary artery disease involving native coronary artery of native heart without angina pectoris  High cholesterol: High cholesterol  Essential hypertension: Essential hypertension  Lung nodule: Lung nodule  Sepsis, due to unspecified organism: Sepsis, due to unspecified organism
Patient is a 89y old  Female who presents with a chief complaint of cough and febrile episode of several days in duration (25 Apr 2018 02:57)      SUBJECTIVE / OVERNIGHT EVENTS:  No overnight events.  Pt feels well. still coughing  Denied cp, sob, n/v/d.   no abdominal pain. No HA/dizziness.       Vital Signs Last 24 Hrs  T(C): 36.6 (29 Apr 2018 12:47), Max: 36.7 (29 Apr 2018 04:54)  T(F): 97.9 (29 Apr 2018 12:47), Max: 98.1 (29 Apr 2018 04:54)  HR: 66 (29 Apr 2018 16:27) (62 - 106)  BP: 119/56 (29 Apr 2018 12:47) (96/50 - 119/56)  BP(mean): --  RR: 18 (29 Apr 2018 12:47) (17 - 18)  SpO2: 96% (29 Apr 2018 16:27) (95% - 100%)  I&O's Summary    28 Apr 2018 07:01  -  29 Apr 2018 07:00  --------------------------------------------------------  IN: 0 mL / OUT: 870 mL / NET: -870 mL        PHYSICAL EXAM:  GENERAL: NAD, Comfortable  HEAD:  Atraumatic, Normocephalic  EYES: EOMI, PERRLA, conjunctiva and sclera clear  NECK: Supple, No JVD  CHEST/LUNG: coarse breath sounds bilaterally, much improved; mild wheeze  HEART: Regular rate and rhythm; No murmurs, rubs, or gallops  ABDOMEN: Soft, Nontender, minimally distended; Bowel sounds present  Neuro: AAO x 2-3, no focal deficit, 5/5 b/l extremities  EXTREMITIES:  2+ Peripheral Pulses, No clubbing, cyanosis, or edema  SKIN: No rashes or lesions      LABS:                        8.1    12.61 )-----------( 119      ( 29 Apr 2018 05:15 )             25.6     04-29    144  |  108<H>  |  48<H>  ----------------------------<  196<H>  4.5   |  22  |  1.98<H>    Ca    8.4      29 Apr 2018 05:15  Phos  2.7     04-29  Mg     2.3     04-29        CAPILLARY BLOOD GLUCOSE                RADIOLOGY & ADDITIONAL TESTS:    Imaging Personally Reviewed:  [x] YES  [ ] NO    Consultant(s) Notes Reviewed:  [x] YES  [ ] NO      MEDICATIONS  (STANDING):  ALBUTerol/ipratropium for Nebulization 3 milliLiter(s) Nebulizer every 6 hours  atorvastatin 20 milliGRAM(s) Oral at bedtime  buDESOnide   0.5 milliGRAM(s) Respule 0.5 milliGRAM(s) Inhalation two times a day  chlorhexidine 2% Cloths 1 Application(s) Topical daily  docusate sodium 100 milliGRAM(s) Oral three times a day  methylPREDNISolone sodium succinate Injectable 20 milliGRAM(s) IV Push every 12 hours  metoprolol succinate ER 50 milliGRAM(s) Oral daily    MEDICATIONS  (PRN):  acetaminophen   Tablet 650 milliGRAM(s) Oral every 6 hours PRN For Temp greater than 38 C (100.4 F) or mild pain  polyethylene glycol 3350 17 Gram(s) Oral at bedtime PRN Constipation      Care Discussed with Consultants/Other Providers [x] YES  [ ] NO    HEALTH ISSUES - PROBLEM Dx:  Hypophosphatemia: Hypophosphatemia  Retention, urine: Retention, urine  HTN (Hypertension): HTN (Hypertension)  Acute kidney injury superimposed on CKD: Acute kidney injury superimposed on CKD  Leukocytosis: Leukocytosis  POOJA (acute kidney injury): POOJA (acute kidney injury)  Other iron deficiency anemia: Other iron deficiency anemia  Acute renal failure, unspecified acute renal failure type: Acute renal failure, unspecified acute renal failure type  Breast Cancer: Breast Cancer  Bronchitis: Bronchitis  Human metapneumovirus (hMPV) as cause of disease classified elsewhere: Human metapneumovirus (hMPV) as cause of disease classified elsewhere  Prophylactic measure: Prophylactic measure  Coronary artery disease involving native coronary artery of native heart without angina pectoris: Coronary artery disease involving native coronary artery of native heart without angina pectoris  High cholesterol: High cholesterol  Essential hypertension: Essential hypertension  Lung nodule: Lung nodule  Sepsis, due to unspecified organism: Sepsis, due to unspecified organism
Patient is a 89y old  Female who presents with a chief complaint of cough and febrile episode of several days in duration (25 Apr 2018 02:57)      SUBJECTIVE / OVERNIGHT EVENTS:  feels well.  able to void.  not retaining any more.  post void residual 30 cc.  Cr improving.      Vital Signs Last 24 Hrs  T(C): 36.2 (28 Apr 2018 04:58), Max: 36.9 (27 Apr 2018 14:25)  T(F): 97.1 (28 Apr 2018 04:58), Max: 98.5 (27 Apr 2018 14:25)  HR: 87 (28 Apr 2018 04:58) (87 - 107)  BP: 111/57 (28 Apr 2018 04:58) (96/50 - 125/78)  BP(mean): --  RR: 18 (28 Apr 2018 04:58) (18 - 18)  SpO2: 100% (28 Apr 2018 04:58) (93% - 100%)  I&O's Summary    27 Apr 2018 07:01  -  28 Apr 2018 07:00  --------------------------------------------------------  IN: 0 mL / OUT: 880 mL / NET: -880 mL    28 Apr 2018 07:01  -  28 Apr 2018 10:54  --------------------------------------------------------  IN: 0 mL / OUT: 250 mL / NET: -250 mL      PHYSICAL EXAM:  GENERAL: NAD, Comfortable  HEAD:  Atraumatic, Normocephalic  EYES: EOMI, PERRLA, conjunctiva and sclera clear  NECK: Supple, No JVD  CHEST/LUNG: coarse breath sounds bilaterally only only basilar, much improved; No wheeze  HEART: Regular rate and rhythm; No murmurs, rubs, or gallops  ABDOMEN: Soft, Nontender, minimally distended; Bowel sounds present  Neuro: AAO x 2-3, no focal deficit, 5/5 b/l extremities  EXTREMITIES:  2+ Peripheral Pulses, No clubbing, cyanosis, or edema  SKIN: No rashes or lesions      LABS:                        10.4   11.11 )-----------( 95       ( 28 Apr 2018 05:15 )             33.5     04-28    144  |  110<H>  |  46<H>  ----------------------------<  175<H>  4.3   |  21<L>  |  2.16<H>    Ca    8.9      28 Apr 2018 05:15  Phos  2.4     04-28  Mg     2.4     04-28        CAPILLARY BLOOD GLUCOSE                RADIOLOGY & ADDITIONAL TESTS:    Imaging Personally Reviewed:  [x] YES  [ ] NO    Consultant(s) Notes Reviewed:  [x] YES  [ ] NO      MEDICATIONS  (STANDING):  ALBUTerol/ipratropium for Nebulization 3 milliLiter(s) Nebulizer every 6 hours  amLODIPine   Tablet 5 milliGRAM(s) Oral daily  atorvastatin 20 milliGRAM(s) Oral at bedtime  buDESOnide   0.5 milliGRAM(s) Respule 0.5 milliGRAM(s) Inhalation two times a day  chlorhexidine 2% Cloths 1 Application(s) Topical daily  docusate sodium 100 milliGRAM(s) Oral three times a day  methylPREDNISolone sodium succinate Injectable 20 milliGRAM(s) IV Push every 12 hours  metoprolol succinate ER 50 milliGRAM(s) Oral daily  potassium acid phosphate/sodium acid phosphate tablet (K-PHOS No. 2) 1 Tablet(s) Oral two times a day with meals    MEDICATIONS  (PRN):  acetaminophen   Tablet 650 milliGRAM(s) Oral every 6 hours PRN For Temp greater than 38 C (100.4 F) or mild pain  polyethylene glycol 3350 17 Gram(s) Oral at bedtime PRN Constipation      Care Discussed with Consultants/Other Providers [x] YES  [ ] NO    HEALTH ISSUES - PROBLEM Dx:  Leukocytosis: Leukocytosis  POOJA (acute kidney injury): POOJA (acute kidney injury)  Other iron deficiency anemia: Other iron deficiency anemia  Acute renal failure, unspecified acute renal failure type: Acute renal failure, unspecified acute renal failure type  Breast Cancer: Breast Cancer  Bronchitis: Bronchitis  Human metapneumovirus (hMPV) as cause of disease classified elsewhere: Human metapneumovirus (hMPV) as cause of disease classified elsewhere  Prophylactic measure: Prophylactic measure  Coronary artery disease involving native coronary artery of native heart without angina pectoris: Coronary artery disease involving native coronary artery of native heart without angina pectoris  High cholesterol: High cholesterol  Essential hypertension: Essential hypertension  Lung nodule: Lung nodule  Sepsis, due to unspecified organism: Sepsis, due to unspecified organism
Pt seen and examined at bedside  feels fairly well  denies dyspnea, but on nasal O2  no chest pain,  no fever,chills  urinating ok      Allergies:  No Known Allergies    Hospital Medications:   MEDICATIONS  (STANDING):  ALBUTerol/ipratropium for Nebulization 3 milliLiter(s) Nebulizer every 6 hours  atorvastatin 20 milliGRAM(s) Oral at bedtime  buDESOnide   0.5 milliGRAM(s) Respule 0.5 milliGRAM(s) Inhalation two times a day  chlorhexidine 2% Cloths 1 Application(s) Topical daily  metoprolol succinate ER 50 milliGRAM(s) Oral daily  predniSONE   Tablet 30 milliGRAM(s) Oral daily         VITALS:  T(F): 97.8 (18 @ 06:20), Max: 98.1 (18 @ 22:38)  HR: 112 (18 @ 06:22)  BP: 114/63 (18 @ 06:20)  RR: 18 (18 @ 06:20)  SpO2: 99% (18 @ 06:20)  Wt(kg): --      PHYSICAL EXAM:  Constitutional: NAD, sitting semi-prone comfortably, with nasal O2  HEENT: anicteric sclera, oropharynx clear, MMM  Neck: No JVD  Respiratory: crept both lower lungs, rt > lt  Cardiovascular: S1, S2, RRR  Gastrointestinal: BS+, soft, NT/ND  Extremities: No cyanosis or clubbing. No peripheral edema  Neurological: A/O x 3, no focal deficits  Psychiatric: Normal mood, normal affect  : No CVA tenderness. No alexander.   Skin: No rashes       LABS:      143  |  108<H>  |  38<H>  ----------------------------<  117<H>  4.3   |  24  |  1.66<H>    Ca    8.4      2018 04:30  Phos  3.4       Mg     2.1           Creatinine Trend: 1.66 <--, 1.98 <--, 2.16 <--, 2.31 <--, 2.07 <--, 1.60 <--, 1.59 <--                        8.6    12.76 )-----------( 139      ( 2018 08:45 )             27.1     Urine Studies:  Urinalysis Basic - ( 2018 09:20 )    Color: YELLOW / Appearance: HAZY / S.015 / pH: 6.0  Gluc: NEGATIVE / Ketone: NEGATIVE  / Bili: NEGATIVE / Urobili: NORMAL mg/dL   Blood: NEGATIVE / Protein: 100 mg/dL / Nitrite: NEGATIVE   Leuk Esterase: NEGATIVE / RBC: 0-2 / WBC 2-5   Sq Epi: OCC / Non Sq Epi:  / Bacteria:         RADIOLOGY & ADDITIONAL STUDIES:
Pt seen and examined at bedside  feels fairly well  denies dyspnea, but on nasal O2  no chest pain,  no fever,chills  urinating ok      Allergies:  No Known Allergies    Hospital Medications:   MEDICATIONS  (STANDING):  ALBUTerol/ipratropium for Nebulization 3 milliLiter(s) Nebulizer every 6 hours  atorvastatin 20 milliGRAM(s) Oral at bedtime  buDESOnide   0.5 milliGRAM(s) Respule 0.5 milliGRAM(s) Inhalation two times a day  chlorhexidine 2% Cloths 1 Application(s) Topical daily  metoprolol succinate ER 50 milliGRAM(s) Oral daily  predniSONE   Tablet 30 milliGRAM(s) Oral daily         VITALS:  Vital Signs Last 24 Hrs  T(C): 36.8 (02 May 2018 12:31), Max: 36.8 (01 May 2018 21:20)  T(F): 98.3 (02 May 2018 12:31), Max: 98.3 (02 May 2018 12:31)  HR: 93 (02 May 2018 15:44) (76 - 96)  BP: 124/62 (02 May 2018 12:31) (118/66 - 124/62)  BP(mean): --  RR: 17 (02 May 2018 12:31) (17 - 18)  SpO2: 97% (02 May 2018 15:44) (95% - 99%)      PHYSICAL EXAM:  Constitutional: NAD, sitting semi-prone comfortably, with nasal O2  HEENT: anicteric sclera, oropharynx clear, MMM  Neck: No JVD  Respiratory: crept both lower lungs, rt > lt  Cardiovascular: S1, S2, RRR  Gastrointestinal: BS+, soft, NT/ND  Extremities: No cyanosis or clubbing. No peripheral edema  Neurological: A/O x 3, no focal deficits  Psychiatric: Normal mood, normal affect  : No CVA tenderness. No alexander.   Skin: No rashes       LABS:      142  |  104  |  30<H>  ----------------------------<  97  4.1   |  29  |  1.46<H>    Ca    8.7      02 May 2018 04:55  Phos  2.6     05-02  Mg     1.8     05-02    Creatinine Trend: 1.46<-1.66 <--, 1.98 <--, 2.16 <--, 2.31 <--, 2.07 <--, 1.60 <--, 1.59 <--                                 8.0    13.10 )-----------( 140      ( 02 May 2018 04:55 )             26.2       Urine Studies:  Urinalysis Basic - ( 2018 09:20 )    Color: YELLOW / Appearance: HAZY / S.015 / pH: 6.0  Gluc: NEGATIVE / Ketone: NEGATIVE  / Bili: NEGATIVE / Urobili: NORMAL mg/dL   Blood: NEGATIVE / Protein: 100 mg/dL / Nitrite: NEGATIVE   Leuk Esterase: NEGATIVE / RBC: 0-2 / WBC 2-5   Sq Epi: OCC / Non Sq Epi:  / Bacteria:         RADIOLOGY & ADDITIONAL STUDIES:
[facility] - Rl / Elizabeth S /Bryanna/ S Han/ PAULA Ocampo/ Jason Cox / JAMARCUS Njeru  ---------------------------------------------------------------------------------------------------------------  Patient seen and examined bedside    Subjective and Objective: No overnight events, No complaints today. feeling better.   no urinary sxs/D/V  family bedside    Allergies: No Known Allergies      Hospital Medications:   MEDICATIONS  (STANDING):  ALBUTerol/ipratropium for Nebulization 3 milliLiter(s) Nebulizer every 6 hours  atorvastatin 20 milliGRAM(s) Oral at bedtime  buDESOnide   0.5 milliGRAM(s) Respule 0.5 milliGRAM(s) Inhalation two times a day  chlorhexidine 2% Cloths 1 Application(s) Topical daily  docusate sodium 100 milliGRAM(s) Oral three times a day  methylPREDNISolone sodium succinate Injectable 20 milliGRAM(s) IV Push every 12 hours  metoprolol succinate ER 50 milliGRAM(s) Oral daily  potassium acid phosphate/sodium acid phosphate tablet (K-PHOS No. 2) 1 Tablet(s) Oral two times a day with meals      VITALS:  Vital Signs Last 24 Hrs  T(C): 36.6 (2018 12:47), Max: 36.7 (2018 04:54)  T(F): 97.9 (2018 12:47), Max: 98.1 (2018 04:54)  HR: 66 (2018 16:27) (62 - 106)  BP: 119/56 (2018 12:47) (96/50 - 119/56)  BP(mean): --  RR: 18 (2018 12:47) (17 - 18)  SpO2: 96% (2018 16:27) (95% - 100%)    I&O's Summary    2018 07: 07:00  --------------------------------------------------------  IN: 0 mL / OUT: 870 mL / NET: -870 mL    PHYSICAL EXAM:  Constitutional: NAD  HEENT: anicteric sclera, oropharynx clear  Neck: No JVD  Respiratory: CTAB, no wheezes, rales or rhonchi  Cardiovascular: S1, S2, RRR  Gastrointestinal: BS+, soft, NT/ND  Extremities: No cyanosis or clubbing. No peripheral edema  Neurological: A/O x 2, no focal deficits  Psychiatric: Normal mood, normal affect  : No CVA tenderness. No alexander.   Skin: No rashes      LABS:      144  |  108<H>  |  48<H>  ----------------------------<  196<H>  4.5   |  22  |  1.98<H>    Ca    8.4      2018 05:15  Phos  2.7       Mg     2.3         Creatinine Trend: 1.98<-2.16 <--, 2.31 <--, 2.07 <--, 1.60 <--, 1.59 <--                                              8.1    12.61 )-----------( 119      ( 2018 05:15 )             25.6       Urine Studies:  Urinalysis Basic - ( 2018 09:20 )    Color: YELLOW / Appearance: HAZY / S.015 / pH: 6.0  Gluc: NEGATIVE / Ketone: NEGATIVE  / Bili: NEGATIVE / Urobili: NORMAL mg/dL   Blood: NEGATIVE / Protein: 100 mg/dL / Nitrite: NEGATIVE   Leuk Esterase: NEGATIVE / RBC: 0-2 / WBC 2-5   Sq Epi: OCC / Non Sq Epi:  / Bacteria:     RADIOLOGY & ADDITIONAL STUDIES:  US Renal (18 @ 09:32) >  IMPRESSION:     Urinary retention.  Parenchymal renal disease.
infectious diseases progress note:    VERONICA MADSEN is a 89y y. o. Female patient    Patient reports: not feeling so well    ROS:    EYES:  Negative  blurry vision or double vision  GASTROINTESTINAL:  Negative for nausea, vomiting, diarrhea  -otherwise negative except for subjective    Allergies    No Known Allergies    Intolerances        ANTIBIOTICS/RELEVANT:  antimicrobials    immunologic:    OTHER:  acetaminophen   Tablet 650 milliGRAM(s) Oral every 6 hours PRN  ALBUTerol/ipratropium for Nebulization 3 milliLiter(s) Nebulizer every 6 hours  amLODIPine   Tablet 5 milliGRAM(s) Oral daily  atorvastatin 20 milliGRAM(s) Oral at bedtime  buDESOnide   0.5 milliGRAM(s) Respule 0.5 milliGRAM(s) Inhalation two times a day  losartan 50 milliGRAM(s) Oral daily  methylPREDNISolone sodium succinate Injectable 20 milliGRAM(s) IV Push every 8 hours  metoprolol succinate ER 50 milliGRAM(s) Oral daily  sodium chloride 0.9%. 1000 milliLiter(s) IV Continuous <Continuous>      Objective:  Last 24-Vital Signs Last 24 Hrs  T(C): 36.8 (26 Apr 2018 08:04), Max: 39.4 (25 Apr 2018 21:33)  T(F): 98.3 (26 Apr 2018 08:04), Max: 102.9 (25 Apr 2018 21:33)  HR: 110 (26 Apr 2018 08:04) (88 - 111)  BP: 96/56 (26 Apr 2018 08:04) (96/56 - 139/69)  BP(mean): --  RR: 17 (26 Apr 2018 08:04) (17 - 19)  SpO2: 97% (26 Apr 2018 05:32) (95% - 97%)    T(C): 36.8 (04-26-18 @ 08:04), Max: 39.4 (04-25-18 @ 21:33)  T(F): 98.3 (04-26-18 @ 08:04), Max: 102.9 (04-25-18 @ 21:33)  T(C): 36.8 (04-26-18 @ 08:04), Max: 39.4 (04-25-18 @ 21:33)  T(F): 98.3 (04-26-18 @ 08:04), Max: 102.9 (04-25-18 @ 21:33)  T(C): 36.8 (04-26-18 @ 08:04), Max: 39.4 (04-25-18 @ 21:33)  T(F): 98.3 (04-26-18 @ 08:04), Max: 102.9 (04-25-18 @ 21:33)    PHYSICAL EXAM:  Constitutional: Well-developed, well nourished  Eyes: PERRLA, EOMI, left eye with darkening  Ear/Nose/Throat: oropharynx normal	  Neck: no JVD, no lymphadenopathy, supple  Respiratory: no accessory muscle use, lung fields bilaterally with scattered ronchi  Cardiovascular: distant  Gastrointestinal: soft, NT, no HSM, BS-normal  Extremities: no clubbing, no cyanosis, edema absent  Neuro: patient alert, oriented and appropriate  Skin: no sig lesions      LABS:                        8.9    10.37 )-----------( 82       ( 26 Apr 2018 07:05 )             29.1       WBC 10.37  04-26 @ 07:05  WBC 7.89  04-25 @ 06:45  WBC 6.22  04-24 @ 18:15      04-26    145  |  110<H>  |  29<H>  ----------------------------<  190<H>  3.7   |  16<L>  |  2.07<H>    Ca    8.2<L>      26 Apr 2018 05:10  Phos  3.5     04-25  Mg     2.2     04-26    TPro  6.6  /  Alb  3.5  /  TBili  0.4  /  DBili  x   /  AST  27  /  ALT  18  /  AlkPhos  61  04-24      Creatinine, Serum: 2.07 mg/dL (04-26-18 @ 05:10)  Creatinine, Serum: 1.60 mg/dL (04-25-18 @ 06:45)  Creatinine, Serum: 1.59 mg/dL (04-24-18 @ 18:15)                MICROBIOLOGY:              RADIOLOGY & ADDITIONAL STUDIES:
[facility] - Rl / Elizabeth S /Bryanna/ PAULA Short/ PAULA Ocampo/ Jason Cox / JAMARCUS Njeru  ---------------------------------------------------------------------------------------------------------------  Patient seen and examined bedside, earlier today    Subjective and Objective: No overnight events, No complaints today. feeling better.   RN bedside, stated voided 250ml urine earlier today    Allergies: No Known Allergies      Hospital Medications:   MEDICATIONS  (STANDING):  ALBUTerol/ipratropium for Nebulization 3 milliLiter(s) Nebulizer every 6 hours  atorvastatin 20 milliGRAM(s) Oral at bedtime  buDESOnide   0.5 milliGRAM(s) Respule 0.5 milliGRAM(s) Inhalation two times a day  chlorhexidine 2% Cloths 1 Application(s) Topical daily  docusate sodium 100 milliGRAM(s) Oral three times a day  methylPREDNISolone sodium succinate Injectable 20 milliGRAM(s) IV Push every 12 hours  metoprolol succinate ER 50 milliGRAM(s) Oral daily  potassium acid phosphate/sodium acid phosphate tablet (K-PHOS No. 2) 1 Tablet(s) Oral two times a day with meals      REVIEW OF SYSTEMS:  CONSTITUTIONAL: No weakness, fevers or chills  EYES/ENT: No visual changes;  No vertigo or throat pain   NECK: No pain or stiffness  RESPIRATORY: No cough, wheezing, hemoptysis; No shortness of breath  CARDIOVASCULAR: No chest pain or palpitations.  GASTROINTESTINAL: No abdominal or epigastric pain. No nausea, vomiting, or hematemesis; No diarrhea or constipation. No melena or hematochezia.  GENITOURINARY: No dysuria, frequency, foamy urine, urinary urgency, incontinence or hematuria  NEUROLOGICAL: No numbness or weakness  SKIN: No itching, burning, rashes, or lesions   VASCULAR: No bilateral lower extremity edema.   All other review of systems is negative unless indicated above.    VITALS:  T(F): 97.8 (18 @ 12:50), Max: 98.2 (18 @ 22:11)  HR: 90 (18 @ 16:15)  BP: 101/47 (18 @ 12:50)  RR: 17 (18 @ 12:50)  SpO2: 99% (18 @ 16:15)  Wt(kg): --     @ 07:01  -   @ 07:00  --------------------------------------------------------  IN: 0 mL / OUT: 880 mL / NET: -880 mL     @ 07:01  -   @ 17:12  --------------------------------------------------------  IN: 0 mL / OUT: 250 mL / NET: -250 mL    PHYSICAL EXAM:  Constitutional: NAD  HEENT: anicteric sclera, oropharynx clear  Neck: No JVD  Respiratory: CTAB, no wheezes, rales or rhonchi  Cardiovascular: S1, S2, RRR  Gastrointestinal: BS+, soft, NT/ND  Extremities: No cyanosis or clubbing. No peripheral edema  Neurological: A/O x 2, no focal deficits  Psychiatric: Normal mood, normal affect  : No CVA tenderness. No alexander.   Skin: No rashes      LABS:      144  |  110<H>  |  46<H>  ----------------------------<  175<H>  4.3   |  21<L>  |  2.16<H>    Ca    8.9      2018 05:15  Phos  2.4       Mg     2.4           Creatinine Trend: 2.16 <--, 2.31 <--, 2.07 <--, 1.60 <--, 1.59 <--                        10.4   11.11 )-----------( 95       ( 2018 05:15 )             33.5     Urine Studies:  Urinalysis Basic - ( 2018 09:20 )    Color: YELLOW / Appearance: HAZY / S.015 / pH: 6.0  Gluc: NEGATIVE / Ketone: NEGATIVE  / Bili: NEGATIVE / Urobili: NORMAL mg/dL   Blood: NEGATIVE / Protein: 100 mg/dL / Nitrite: NEGATIVE   Leuk Esterase: NEGATIVE / RBC: 0-2 / WBC 2-5   Sq Epi: OCC / Non Sq Epi:  / Bacteria:     RADIOLOGY & ADDITIONAL STUDIES:  US Renal (04.27.18 @ 09:32) >  IMPRESSION:     Urinary retention.  Parenchymal renal disease.
[facility] [place] [facility] - Rl / Elizabeth S /Bryanna/ PAULA Short/ PAULA Ocampo/ Jason Cox / JAMARCUS Njeru  ---------------------------------------------------------------------------------------------------------------  Patient seen and examined bedside    Subjective and Objective: No overnight events, No complaints today. feeling better.   no urinary sxs/D/V    Allergies: No Known Allergies      Hospital Medications:   MEDICATIONS  (STANDING):  ALBUTerol/ipratropium for Nebulization 3 milliLiter(s) Nebulizer every 6 hours  atorvastatin 20 milliGRAM(s) Oral at bedtime  buDESOnide   0.5 milliGRAM(s) Respule 0.5 milliGRAM(s) Inhalation two times a day  chlorhexidine 2% Cloths 1 Application(s) Topical daily  docusate sodium 100 milliGRAM(s) Oral three times a day  methylPREDNISolone sodium succinate Injectable 20 milliGRAM(s) IV Push every 12 hours  metoprolol succinate ER 50 milliGRAM(s) Oral daily  potassium acid phosphate/sodium acid phosphate tablet (K-PHOS No. 2) 1 Tablet(s) Oral two times a day with meals      VITALS:  Vital Signs Last 24 Hrs  T(C): 36.8 (2018 05:27), Max: 36.9 (2018 21:34)  T(F): 98.2 (2018 05:27), Max: 98.4 (2018 21:34)  HR: 82 (2018 09:44) (66 - 99)  BP: 105/52 (2018 05:27) (105/52 - 123/67)  BP(mean): --  RR: 18 (2018 05:27) (17 - 18)  SpO2: 97% (2018 09:44) (96% - 100%)    I&O's Summary    2018 07:01  -  2018 07:00  --------------------------------------------------------  IN: 0 mL / OUT: 470 mL / NET: -470 mL    PHYSICAL EXAM:  Constitutional: NAD  HEENT: anicteric sclera, oropharynx clear  Neck: No JVD  Respiratory: CTAB, no wheezes, rales or rhonchi  Cardiovascular: S1, S2, RRR  Gastrointestinal: BS+, soft, NT/ND  Extremities: No cyanosis or clubbing. No peripheral edema  Neurological: A/O x 2, no focal deficits  Psychiatric: Normal mood, normal affect  : No CVA tenderness. No alexander.   Skin: No rashes      LABS:      143  |  108<H>  |  38<H>  ----------------------------<  117<H>  4.3   |  24  |  1.66<H>    Ca    8.4      2018 04:30  Phos  3.4     04-30  Mg     2.1     04-30    Creatinine Trend: 1.66<-1.98<-2.16 <--, 2.31 <--, 2.07 <--, 1.60 <--, 1.59 <--                                              8.6    12.76 )-----------( 139      ( 2018 08:45 )             27.1         Urine Studies:  Urinalysis Basic - ( 2018 09:20 )    Color: YELLOW / Appearance: HAZY / S.015 / pH: 6.0  Gluc: NEGATIVE / Ketone: NEGATIVE  / Bili: NEGATIVE / Urobili: NORMAL mg/dL   Blood: NEGATIVE / Protein: 100 mg/dL / Nitrite: NEGATIVE   Leuk Esterase: NEGATIVE / RBC: 0-2 / WBC 2-5   Sq Epi: OCC / Non Sq Epi:  / Bacteria:     RADIOLOGY & ADDITIONAL STUDIES:  US Renal (18 @ 09:32) >  IMPRESSION:     Urinary retention.  Parenchymal renal disease.
Patient is a 89y old  Female who presents with a chief complaint of cough and febrile episode of several days in duration (25 Apr 2018 02:57)    Any change in ROS: doing ok. denies sob, cough, sputum     MEDICATIONS  (STANDING):  ALBUTerol/ipratropium for Nebulization 3 milliLiter(s) Nebulizer every 6 hours  amLODIPine   Tablet 5 milliGRAM(s) Oral daily  atorvastatin 20 milliGRAM(s) Oral at bedtime  buDESOnide   0.5 milliGRAM(s) Respule 0.5 milliGRAM(s) Inhalation two times a day  chlorhexidine 2% Cloths 1 Application(s) Topical daily  docusate sodium 100 milliGRAM(s) Oral three times a day  methylPREDNISolone sodium succinate Injectable 20 milliGRAM(s) IV Push every 12 hours  metoprolol succinate ER 50 milliGRAM(s) Oral daily  potassium acid phosphate/sodium acid phosphate tablet (K-PHOS No. 2) 1 Tablet(s) Oral two times a day with meals    MEDICATIONS  (PRN):  acetaminophen   Tablet 650 milliGRAM(s) Oral every 6 hours PRN For Temp greater than 38 C (100.4 F) or mild pain  polyethylene glycol 3350 17 Gram(s) Oral at bedtime PRN Constipation    Vital Signs Last 24 Hrs  T(C): 36.2 (28 Apr 2018 04:58), Max: 36.9 (27 Apr 2018 14:25)  T(F): 97.1 (28 Apr 2018 04:58), Max: 98.5 (27 Apr 2018 14:25)  HR: 60 (28 Apr 2018 10:55) (60 - 107)  BP: 111/57 (28 Apr 2018 04:58) (96/50 - 125/78)  BP(mean): --  RR: 18 (28 Apr 2018 04:58) (18 - 18)  SpO2: 94% (28 Apr 2018 10:55) (93% - 100%)    I&O's Summary    27 Apr 2018 07:01  -  28 Apr 2018 07:00  --------------------------------------------------------  IN: 0 mL / OUT: 880 mL / NET: -880 mL    28 Apr 2018 07:01  -  28 Apr 2018 12:37  --------------------------------------------------------  IN: 0 mL / OUT: 250 mL / NET: -250 mL    Physical Exam:   General: NAD, well developed, well nourished.   Eyes: conjunctiva and sclera clear  Head: Normocephalic, atraumatic  ENMT: No nasal discharge, airway clear  Neck: Supple, non tender,  no masses, no JVD  Respiratory: soft expiratory wheezing (+)  Cardiovascular: Regular rate and rhythm, no murmurs.  Gastrointestinal: Soft non-tender non-distended, positive bowel sounds  Extremities: Normal range of motion, no clubbing, cyanosis or edema  Vascular: Peripheral pulses palpable 2+ bilaterally  Neurological: Alert and oriented x3, follows commands, answers questions appropriately.   Skin: Warm and dry. No obvious rash  Lymph Nodes: No acute cervical or supraclavicular adenopathy  Musculoskeletal: kyphosis (+), Move all extremities,   Psychiatric: Cooperative and appropriate mood    Labs:  23                            10.4   11.11 )-----------( 95       ( 28 Apr 2018 05:15 )             33.5                         8.5    16.56 )-----------( 104      ( 27 Apr 2018 07:09 )             27.7                         8.9    10.37 )-----------( 82       ( 26 Apr 2018 07:05 )             29.1                         8.4    7.89  )-----------( 71       ( 25 Apr 2018 06:45 )             29.0                         8.8    6.22  )-----------( 100      ( 24 Apr 2018 18:15 )             28.2     04-28    144  |  110<H>  |  46<H>  ----------------------------<  175<H>  4.3   |  21<L>  |  2.16<H>  04-27    146<H>  |  110<H>  |  39<H>  ----------------------------<  187<H>  3.0<L>   |  19<L>  |  2.31<H>  04-26    145  |  110<H>  |  29<H>  ----------------------------<  190<H>  3.7   |  16<L>  |  2.07<H>  04-25    140  |  107  |  28<H>  ----------------------------<  124<H>  3.8   |  17<L>  |  1.60<H>  04-24    142  |  106  |  36<H>  ----------------------------<  127<H>  4.2   |  21<L>  |  1.59<H>    Ca    8.9      28 Apr 2018 05:15  Ca    8.5      27 Apr 2018 07:09  Phos  2.4     04-28  Mg     2.4     04-28    TPro  6.6  /  Alb  3.5  /  TBili  0.4  /  DBili  x   /  AST  27  /  ALT  18  /  AlkPhos  61  04-24    CAPILLARY BLOOD GLUCOSE                Lactate, Blood: 1.4 mmol/L (04-27 @ 07:09)  Cultures:           Wound culture:                04-26 @ 09:56  Organism --  Culture w/ gram stain --  Specimen Source BLOOD    Wound culture:                04-24 @ 19:07  Organism --  Culture w/ gram stain --  Specimen Source BLOOD PERIPHERAL      Abscess culture:             04-26 @ 09:56  Organism --  Gram Stain --  Specimen Source BLOOD    Abscess culture:             04-24 @ 19:07  Organism --  Gram Stain --  Specimen Source BLOOD PERIPHERAL        Tissue culture:           04-26 @ 09:56  Organism --  Gram Stain --  Specimen Source BLOOD    Tissue culture:           04-24 @ 19:07  Organism --  Gram Stain --  Specimen Source BLOOD PERIPHERAL      Body Fluid Smear & Culture:                        04-26 @ 09:56  AFB Smear  --  Culture Acid Fast Body Fluid w/ Smear  --  Culture Acid Fast Smear Concentrated   --    Culture Results:     --  Specimen Source BLOOD    Body Fluid Smear & Culture:                        04-24 @ 19:07  AFB Smear  --  Culture Acid Fast Body Fluid w/ Smear  --  Culture Acid Fast Smear Concentrated   --    Culture Results:     --  Specimen Source BLOOD PERIPHERAL        Rapid Respiratory Viral Panel Result        04-24 @ 19:00  Rapid RVP --  Coronovirus --  Adenovirus NOT DETECTED  Bordetella Pertussis NOT DETECTED  Chlamydia Pneumonia NOT DETECTED  Entero/RhinovirusNOT DETECTED  HKU1 Coronovirus --  HMPV Coronovirus POSITIVE  Influenza A NOT DETECTED (any subtype)  Influenza AH1 NOT DETECTED  Influenza AH1 2009 NOT DETECTED  Influenza AH3 NOT DETECTED  Influenza B NOT DETECTED  Mycoplasma Pneumoniae NOT DETECTED This nucleic acid amplification assay was performed using  the [employer]. The following pathogens are tested  for: Adenovirus, Coronavirus 229E, Coronavirus HKU1,  Coronavirus NL63, Coronavirus OC43, Human Metapneumovirus  (HMPV), Rhinovirus/Enterovirus, Influenza A H1, Influenza A  H1 2009 (Pandemic H1 2009), Influenza A H3, Influenza A (Flu  A) subtype not identified, Influenza B, Parainfluenza Virus  (types 1, 2, 3, 4), Respiratory Syncytial Virus (RSV),  Bordetella pertussis, Chlamydophila pneumoniae, and  Mycoplasma pneumoniae. A negative FilmArray result does not  always exclude the possibility of viral or bacterial  infection. Laboratory results should always be interpreted  in the context of clinical findings.  NL63 Coronovirus --  OC43 Coronovirus --  Parainfluenza 1 NOT DETECTED  Parainfluenza 2 NOT DETECTED  Parainfluenza 3 NOT DETECTED  Parainfluenza 4 NOT DETECTED  Resp Syncytial Virus NOT DETECTED    Studies  Chest X-RAY < from: Xray Chest 1 View AP/PA (04.24.18 @ 20:31) >    EXAM:  XR CHEST AP OR PA 1V        PROCEDURE DATE:  Apr 24 2018         INTERPRETATION:  EXAMINATION: XR CHEST AP OR PA 1V    CLINICAL INDICATION:  Fever. Cough. On chemotherapy.     TECHNIQUE: Frontal radiograph of the chest was obtained on 4/24/2018     COMPARISON: Chest radiograph 8/14/2015.    FINDINGS:     Right chest wall port with tip overlying the SVC. The cardiac silhouette   is normal in size. There is no pleural effusion. There is no   pneumothorax. No focal consolidation identified.There are degenerative   changes of the visualized thoracic spine. Aortic calcifications.    IMPRESSION:   No acute pulmonary disease.    < end of copied text >    CT SCAN Chest < from: CT Chest No Cont (04.27.18 @ 08:56) >  EXAM:  CT CHEST        PROCEDURE DATE:  Apr 27 2018         INTERPRETATION:  CLINICAL INFORMATION: History of lung cancer on   chemotherapy. Pleural effusions and bronchitis. History of human   metapneumovirus.    COMPARISON: Imported total body PET CT 7/28/2015    PROCEDURE:   CT of the Chest was performed without intravenous contrast.  Sagittal and coronal reformats were performed.      FINDINGS:    CHEST:     LUNGS AND LARGE AIRWAYS: Multi lobar dense and groundglass consolidations   as wellas air bronchograms compatible with multifocal pneumonia. An   underlying malignancy cannot be excluded. No pulmonary nodules or   discrete masses. Limited assessment of the central airways due to motion   artifact. No gross endobronchial lesions.  PLEURA: No pleural effusion.    VESSELS:     Coronary and aortic atherosclerosis.    HEART: Heart size is normal. No pericardial effusion. Mitral annular   calcifications noted    MEDIASTINUM AND VERA: An enlarged subcarinal lymph node is seen measuring   2.4 x 1.8 cm. No anterior mediastinal lymphadenopathy or axillary or   supraclavicular lymphadenopathy noted.    CHEST WALL AND LOWER NECK: Within normal limits.    VISUALIZED UPPER ABDOMEN: Within normal limits.    BONES: Thoracic spondylosis. Noblastic or lytic lesions.    IMPRESSION:     Patchy areas of consolidation throughout both lungs suggestive of   multifocal pneumonia. 2 solid-appearing nodules at the right lung base   measuring approximate 7-8 mm may also reflect other foci of infection. If   there are more recent examinations since the PET/CT of 2015, they should   be submitted for comparison. In the absence of any outside imaging, a   repeat CT can be performed in one-3 months.    Nonspecific enlargement of a subcarinal lymphnode up to 2.4 x 1.8 cm,   new since prior PET/CT. Attention on follow-up.      < end of copied text >    Venous Dopplers: LE:   CT Abdomen < from: CT Abdomen and Pelvis w/o Cont (02.01.13 @ 14:26) >    EXAM:  CT ABD AND PELV WO CON        PROCEDURE DATE:  Feb 1 2013   .      INTERPRETATION:  CLINICAL INDICATION: 84-year-old female with abdominal   pain, diarrhea. Evaluate for diverticular disease.    TECHNIQUE: CT scan of the abdomen and pelvis was performed from the domes   of the diaphragm to the symphysis pubis with oral contrast only.    Sagittal and coronal reformatted series are provided.    There is no prior study for comparison.    FINDINGS:   The unenhanced  liver, gallbladder, spleen, pancreas, adrenal glands, and   kidneys are unremarkable aside from a large dense gallstone.    There is a long segment of marked wall thickening and luminal narrowing   of the transverse colon with minimal pericolonic inflammatory change.   Thereis no diverticulosis or bowel obstruction. The appendix is normal.    The abdominal aorta is normal in caliber. The infrahepatic IVC appear   slitlike suggestive of hypovolemia. There is no retroperitoneal, pelvic   or inguinal adenopathy.     The patient is status post hysterectomy. There are no adnexal masses. The   urinary bladder is completely collapsed.    A 3 x 5 mm posterior right lower lobe pulmonary nodule is identified. The   lung bases are otherwise clear. Prominent mitral annular calcifications   are present.    The visualized osseous structures demonstrate mild anterolisthesis of L4   on L5.    IMPRESSION:  Transverse colon colitis, most likely of ischemic etiology,   less likely infectious.     Slit-like infrahepatic IVC and completely collapsed urinary bladder.   Clinical correlation for hypovolemia recommended.    Complete chest can be performed to further evaluate as clinically   warranted.    < end of copied text >    Others
Patient is a 89y old  Female who presents with a chief complaint of cough and febrile episode of several days in duration (2018 02:57)      SUBJECTIVE / OVERNIGHT EVENTS:  Breathing improving.   Cr trending up.  Retaining urine on renal US.  the son at bedside.  Pt feeling better. +cough.  no wheeze.   ate breakfast well this am.   The son confirm that she is at her baseline mental status.       Vital Signs Last 24 Hrs  T(C): 36.1 (2018 05:22), Max: 38.3 (2018 21:43)  T(F): 96.9 (2018 05:22), Max: 100.9 (2018 21:43)  HR: 99 (2018 08:24) (87 - 119)  BP: 118/57 (2018 08:24) (105/52 - 122/59)  BP(mean): --  RR: 18 (2018 05:22) (17 - 19)  SpO2: 98% (2018 05:22) (95% - 98%)  I&O's Summary      PHYSICAL EXAM:  GENERAL: NAD, Comfortable  HEAD:  Atraumatic, Normocephalic  EYES: EOMI, PERRLA, conjunctiva and sclera clear  NECK: Supple, No JVD  CHEST/LUNG: coarse breath sounds bilaterally; No wheeze  HEART: Regular rate and rhythm; No murmurs, rubs, or gallops  ABDOMEN: Soft, Nontender, minimally distended; Bowel sounds present  Neuro: AAO x 2, no focal deficit, 5/5 b/l extremities  EXTREMITIES:  2+ Peripheral Pulses, No clubbing, cyanosis, or edema  SKIN: No rashes or lesions        LABS:                        8.5    16.56 )-----------( 104      ( 2018 07:09 )             27.7     04-27    146<H>  |  110<H>  |  39<H>  ----------------------------<  187<H>  3.0<L>   |  19<L>  |  2.31<H>    Ca    8.5      2018 07:09  Mg     2.2     04-26        CAPILLARY BLOOD GLUCOSE            Urinalysis Basic - ( 2018 09:20 )    Color: YELLOW / Appearance: HAZY / S.015 / pH: 6.0  Gluc: NEGATIVE / Ketone: NEGATIVE  / Bili: NEGATIVE / Urobili: NORMAL mg/dL   Blood: NEGATIVE / Protein: 100 mg/dL / Nitrite: NEGATIVE   Leuk Esterase: NEGATIVE / RBC: 0-2 / WBC 2-5   Sq Epi: OCC / Non Sq Epi: x / Bacteria: x        RADIOLOGY & ADDITIONAL TESTS:    Imaging Personally Reviewed:  [x] YES  [ ] NO    Consultant(s) Notes Reviewed:  [x] YES  [ ] NO      MEDICATIONS  (STANDING):  ALBUTerol/ipratropium for Nebulization 3 milliLiter(s) Nebulizer every 6 hours  amLODIPine   Tablet 5 milliGRAM(s) Oral daily  atorvastatin 20 milliGRAM(s) Oral at bedtime  buDESOnide   0.5 milliGRAM(s) Respule 0.5 milliGRAM(s) Inhalation two times a day  methylPREDNISolone sodium succinate Injectable 20 milliGRAM(s) IV Push every 8 hours  metoprolol succinate ER 50 milliGRAM(s) Oral daily  sodium chloride 0.45% 1000 milliLiter(s) (75 mL/Hr) IV Continuous <Continuous>    MEDICATIONS  (PRN):  acetaminophen   Tablet 650 milliGRAM(s) Oral every 6 hours PRN For Temp greater than 38 C (100.4 F) or mild pain      Care Discussed with Consultants/Other Providers [x] YES  [ ] NO    HEALTH ISSUES - PROBLEM Dx:  Other iron deficiency anemia: Other iron deficiency anemia  Acute renal failure, unspecified acute renal failure type: Acute renal failure, unspecified acute renal failure type  Breast Cancer: Breast Cancer  Bronchitis: Bronchitis  Human metapneumovirus (hMPV) as cause of disease classified elsewhere: Human metapneumovirus (hMPV) as cause of disease classified elsewhere  Prophylactic measure: Prophylactic measure  Coronary artery disease involving native coronary artery of native heart without angina pectoris: Coronary artery disease involving native coronary artery of native heart without angina pectoris  High cholesterol: High cholesterol  Essential hypertension: Essential hypertension  Lung nodule: Lung nodule  Sepsis, due to unspecified organism: Sepsis, due to unspecified organism
Patient is a 89y old  Female who presents with a chief complaint of cough and febrile episode of several days in duration (25 Apr 2018 02:57)      SUBJECTIVE / OVERNIGHT EVENTS:  No overnight events.  No complaints. +cough but improves.  No cp, sob, abdominal pain.  No n/v/d. no HA/dizziness.   Cr much better.  able to void.       Vital Signs Last 24 Hrs  T(C): 36.6 (01 May 2018 13:20), Max: 36.7 (30 Apr 2018 22:38)  T(F): 97.9 (01 May 2018 13:20), Max: 98.1 (30 Apr 2018 22:38)  HR: 99 (01 May 2018 13:20) (90 - 117)  BP: 110/- (01 May 2018 13:20) (110/- - 124/53)  BP(mean): --  RR: 18 (01 May 2018 13:20) (18 - 18)  SpO2: 98% (01 May 2018 13:20) (98% - 99%)  I&O's Summary      PHYSICAL EXAM:  GENERAL: NAD, Comfortable  HEAD:  Atraumatic, Normocephalic  EYES: EOMI, PERRLA, conjunctiva and sclera clear  NECK: Supple, No JVD  CHEST/LUNG: coarse breath sounds bilaterally, much improved; no wheeze  HEART: Regular rate and rhythm; No murmurs, rubs, or gallops  ABDOMEN: Soft, Nontender, minimally distended; Bowel sounds present  Neuro: AAO x 3, no focal deficit, 5/5 b/l extremities  EXTREMITIES:  2+ Peripheral Pulses, No clubbing, cyanosis, or edema  SKIN: No rashes or lesions      LABS:                        8.8    14.72 )-----------( 143      ( 01 May 2018 12:45 )             28.7     05-01    141  |  103  |  34<H>  ----------------------------<  169<H>  5.1   |  27  |  1.39<H>    Ca    9.0      01 May 2018 12:45  Phos  2.9     05-01  Mg     2.0     05-01        CAPILLARY BLOOD GLUCOSE                RADIOLOGY & ADDITIONAL TESTS:    Imaging Personally Reviewed:  [x] YES  [ ] NO    Consultant(s) Notes Reviewed:  [x] YES  [ ] NO      MEDICATIONS  (STANDING):  ALBUTerol/ipratropium for Nebulization 3 milliLiter(s) Nebulizer every 6 hours  atorvastatin 20 milliGRAM(s) Oral at bedtime  buDESOnide   0.5 milliGRAM(s) Respule 0.5 milliGRAM(s) Inhalation two times a day  chlorhexidine 2% Cloths 1 Application(s) Topical daily  metoprolol succinate ER 50 milliGRAM(s) Oral daily  predniSONE   Tablet 30 milliGRAM(s) Oral daily    MEDICATIONS  (PRN):  acetaminophen   Tablet 650 milliGRAM(s) Oral every 6 hours PRN For Temp greater than 38 C (100.4 F) or mild pain  polyethylene glycol 3350 17 Gram(s) Oral at bedtime PRN Constipation      Care Discussed with Consultants/Other Providers [x] YES  [ ] NO    HEALTH ISSUES - PROBLEM Dx:  Hypophosphatemia: Hypophosphatemia  Retention, urine: Retention, urine  HTN (Hypertension): HTN (Hypertension)  Acute kidney injury superimposed on CKD: Acute kidney injury superimposed on CKD  Leukocytosis: Leukocytosis  POOJA (acute kidney injury): POOJA (acute kidney injury)  Other iron deficiency anemia: Other iron deficiency anemia  Acute renal failure, unspecified acute renal failure type: Acute renal failure, unspecified acute renal failure type  Breast Cancer: Breast Cancer  Bronchitis: Bronchitis  Human metapneumovirus (hMPV) as cause of disease classified elsewhere: Human metapneumovirus (hMPV) as cause of disease classified elsewhere  Prophylactic measure: Prophylactic measure  Coronary artery disease involving native coronary artery of native heart without angina pectoris: Coronary artery disease involving native coronary artery of native heart without angina pectoris  High cholesterol: High cholesterol  Essential hypertension: Essential hypertension  Lung nodule: Lung nodule  Sepsis, due to unspecified organism: Sepsis, due to unspecified organism

## 2019-02-01 ENCOUNTER — OUTPATIENT (OUTPATIENT)
Dept: OUTPATIENT SERVICES | Facility: HOSPITAL | Age: 84
LOS: 1 days | End: 2019-02-01
Payer: MEDICARE

## 2019-02-01 PROCEDURE — G9001: CPT

## 2019-02-11 ENCOUNTER — EMERGENCY (EMERGENCY)
Facility: HOSPITAL | Age: 84
LOS: 1 days | Discharge: HOME | End: 2019-02-11
Attending: EMERGENCY MEDICINE | Admitting: EMERGENCY MEDICINE
Payer: MEDICARE

## 2019-02-11 VITALS
DIASTOLIC BLOOD PRESSURE: 62 MMHG | HEART RATE: 104 BPM | SYSTOLIC BLOOD PRESSURE: 108 MMHG | RESPIRATION RATE: 17 BRPM | OXYGEN SATURATION: 95 % | TEMPERATURE: 98 F

## 2019-02-11 VITALS
RESPIRATION RATE: 22 BRPM | DIASTOLIC BLOOD PRESSURE: 60 MMHG | HEART RATE: 100 BPM | SYSTOLIC BLOOD PRESSURE: 153 MMHG | OXYGEN SATURATION: 98 %

## 2019-02-11 LAB
ALBUMIN SERPL ELPH-MCNC: 3.9 G/DL — SIGNIFICANT CHANGE UP (ref 3.3–5)
ALP SERPL-CCNC: 137 U/L — HIGH (ref 40–120)
ALT FLD-CCNC: 52 U/L — HIGH (ref 4–33)
ANION GAP SERPL CALC-SCNC: 11 MMO/L — SIGNIFICANT CHANGE UP (ref 7–14)
ANION GAP SERPL CALC-SCNC: 15 MMO/L — HIGH (ref 7–14)
APTT BLD: 32.5 SEC — SIGNIFICANT CHANGE UP (ref 27.5–36.3)
AST SERPL-CCNC: 75 U/L — HIGH (ref 4–32)
B PERT DNA SPEC QL NAA+PROBE: NOT DETECTED — SIGNIFICANT CHANGE UP
BASE EXCESS BLDV CALC-SCNC: -1.5 MMOL/L — SIGNIFICANT CHANGE UP
BASE EXCESS BLDV CALC-SCNC: -10.7 MMOL/L — SIGNIFICANT CHANGE UP
BASE EXCESS BLDV CALC-SCNC: 1.2 MMOL/L — SIGNIFICANT CHANGE UP
BASOPHILS # BLD AUTO: 0.01 K/UL — SIGNIFICANT CHANGE UP (ref 0–0.2)
BASOPHILS NFR BLD AUTO: 0.1 % — SIGNIFICANT CHANGE UP (ref 0–2)
BILIRUB SERPL-MCNC: 0.4 MG/DL — SIGNIFICANT CHANGE UP (ref 0.2–1.2)
BLOOD GAS VENOUS - CREATININE: 0.99 MG/DL — SIGNIFICANT CHANGE UP (ref 0.5–1.3)
BLOOD GAS VENOUS - CREATININE: 1.18 MG/DL — SIGNIFICANT CHANGE UP (ref 0.5–1.3)
BLOOD GAS VENOUS - CREATININE: SIGNIFICANT CHANGE UP MG/DL (ref 0.5–1.3)
BUN SERPL-MCNC: 26 MG/DL — HIGH (ref 7–23)
BUN SERPL-MCNC: 32 MG/DL — HIGH (ref 7–23)
C PNEUM DNA SPEC QL NAA+PROBE: NOT DETECTED — SIGNIFICANT CHANGE UP
CALCIUM SERPL-MCNC: 10.5 MG/DL — SIGNIFICANT CHANGE UP (ref 8.4–10.5)
CALCIUM SERPL-MCNC: 9.3 MG/DL — SIGNIFICANT CHANGE UP (ref 8.4–10.5)
CHLORIDE BLDV-SCNC: 108 MMOL/L — SIGNIFICANT CHANGE UP (ref 96–108)
CHLORIDE BLDV-SCNC: 112 MMOL/L — HIGH (ref 96–108)
CHLORIDE BLDV-SCNC: 115 MMOL/L — HIGH (ref 96–108)
CHLORIDE SERPL-SCNC: 101 MMOL/L — SIGNIFICANT CHANGE UP (ref 98–107)
CHLORIDE SERPL-SCNC: 108 MMOL/L — HIGH (ref 98–107)
CO2 SERPL-SCNC: 21 MMOL/L — LOW (ref 22–31)
CO2 SERPL-SCNC: 25 MMOL/L — SIGNIFICANT CHANGE UP (ref 22–31)
CREAT SERPL-MCNC: 1.18 MG/DL — SIGNIFICANT CHANGE UP (ref 0.5–1.3)
CREAT SERPL-MCNC: 1.42 MG/DL — HIGH (ref 0.5–1.3)
EOSINOPHIL # BLD AUTO: 0.05 K/UL — SIGNIFICANT CHANGE UP (ref 0–0.5)
EOSINOPHIL NFR BLD AUTO: 0.7 % — SIGNIFICANT CHANGE UP (ref 0–6)
FLUAV H1 2009 PAND RNA SPEC QL NAA+PROBE: NOT DETECTED — SIGNIFICANT CHANGE UP
FLUAV H1 RNA SPEC QL NAA+PROBE: NOT DETECTED — SIGNIFICANT CHANGE UP
FLUAV H3 RNA SPEC QL NAA+PROBE: NOT DETECTED — SIGNIFICANT CHANGE UP
FLUAV SUBTYP SPEC NAA+PROBE: NOT DETECTED — SIGNIFICANT CHANGE UP
FLUBV RNA SPEC QL NAA+PROBE: NOT DETECTED — SIGNIFICANT CHANGE UP
GAS PNL BLDV: 136 MMOL/L — SIGNIFICANT CHANGE UP (ref 136–146)
GAS PNL BLDV: 136 MMOL/L — SIGNIFICANT CHANGE UP (ref 136–146)
GAS PNL BLDV: 140 MMOL/L — SIGNIFICANT CHANGE UP (ref 136–146)
GLUCOSE BLDV-MCNC: 102 — HIGH (ref 70–99)
GLUCOSE BLDV-MCNC: 105 — HIGH (ref 70–99)
GLUCOSE BLDV-MCNC: 138 — HIGH (ref 70–99)
GLUCOSE SERPL-MCNC: 107 MG/DL — HIGH (ref 70–99)
GLUCOSE SERPL-MCNC: 135 MG/DL — HIGH (ref 70–99)
HADV DNA SPEC QL NAA+PROBE: NOT DETECTED — SIGNIFICANT CHANGE UP
HCO3 BLDV-SCNC: 15 MMOL/L — LOW (ref 20–27)
HCO3 BLDV-SCNC: 22 MMOL/L — SIGNIFICANT CHANGE UP (ref 20–27)
HCO3 BLDV-SCNC: 25 MMOL/L — SIGNIFICANT CHANGE UP (ref 20–27)
HCOV PNL SPEC NAA+PROBE: SIGNIFICANT CHANGE UP
HCT VFR BLD CALC: 39.6 % — SIGNIFICANT CHANGE UP (ref 34.5–45)
HCT VFR BLDV CALC: 32.3 % — LOW (ref 34.5–45)
HCT VFR BLDV CALC: 35.1 % — SIGNIFICANT CHANGE UP (ref 34.5–45)
HCT VFR BLDV CALC: 37 % — SIGNIFICANT CHANGE UP (ref 34.5–45)
HGB BLD-MCNC: 12.3 G/DL — SIGNIFICANT CHANGE UP (ref 11.5–15.5)
HGB BLDV-MCNC: 10.5 G/DL — LOW (ref 11.5–15.5)
HGB BLDV-MCNC: 11.4 G/DL — LOW (ref 11.5–15.5)
HGB BLDV-MCNC: 12 G/DL — SIGNIFICANT CHANGE UP (ref 11.5–15.5)
HMPV RNA SPEC QL NAA+PROBE: NOT DETECTED — SIGNIFICANT CHANGE UP
HPIV1 RNA SPEC QL NAA+PROBE: NOT DETECTED — SIGNIFICANT CHANGE UP
HPIV2 RNA SPEC QL NAA+PROBE: NOT DETECTED — SIGNIFICANT CHANGE UP
HPIV3 RNA SPEC QL NAA+PROBE: NOT DETECTED — SIGNIFICANT CHANGE UP
HPIV4 RNA SPEC QL NAA+PROBE: NOT DETECTED — SIGNIFICANT CHANGE UP
IMM GRANULOCYTES NFR BLD AUTO: 0.4 % — SIGNIFICANT CHANGE UP (ref 0–1.5)
INR BLD: 0.97 — SIGNIFICANT CHANGE UP (ref 0.88–1.17)
LACTATE BLDV-MCNC: 2.4 MMOL/L — HIGH (ref 0.5–2)
LACTATE BLDV-MCNC: 3 MMOL/L — HIGH (ref 0.5–2)
LACTATE BLDV-MCNC: 4.4 MMOL/L — CRITICAL HIGH (ref 0.5–2)
LYMPHOCYTES # BLD AUTO: 2.37 K/UL — SIGNIFICANT CHANGE UP (ref 1–3.3)
LYMPHOCYTES # BLD AUTO: 32.1 % — SIGNIFICANT CHANGE UP (ref 13–44)
MCHC RBC-ENTMCNC: 30.4 PG — SIGNIFICANT CHANGE UP (ref 27–34)
MCHC RBC-ENTMCNC: 31.1 % — LOW (ref 32–36)
MCV RBC AUTO: 98 FL — SIGNIFICANT CHANGE UP (ref 80–100)
MONOCYTES # BLD AUTO: 0.61 K/UL — SIGNIFICANT CHANGE UP (ref 0–0.9)
MONOCYTES NFR BLD AUTO: 8.3 % — SIGNIFICANT CHANGE UP (ref 2–14)
NEUTROPHILS # BLD AUTO: 4.31 K/UL — SIGNIFICANT CHANGE UP (ref 1.8–7.4)
NEUTROPHILS NFR BLD AUTO: 58.4 % — SIGNIFICANT CHANGE UP (ref 43–77)
NRBC # FLD: 0 K/UL — LOW (ref 25–125)
PCO2 BLDV: 40 MMHG — LOW (ref 41–51)
PCO2 BLDV: 43 MMHG — SIGNIFICANT CHANGE UP (ref 41–51)
PCO2 BLDV: 43 MMHG — SIGNIFICANT CHANGE UP (ref 41–51)
PH BLDV: 7.2 PH — CRITICAL LOW (ref 7.32–7.43)
PH BLDV: 7.36 PH — SIGNIFICANT CHANGE UP (ref 7.32–7.43)
PH BLDV: 7.42 PH — SIGNIFICANT CHANGE UP (ref 7.32–7.43)
PLATELET # BLD AUTO: 155 K/UL — SIGNIFICANT CHANGE UP (ref 150–400)
PMV BLD: 11.6 FL — SIGNIFICANT CHANGE UP (ref 7–13)
PO2 BLDV: 25 MMHG — LOW (ref 35–40)
PO2 BLDV: 27 MMHG — LOW (ref 35–40)
PO2 BLDV: 43 MMHG — HIGH (ref 35–40)
POTASSIUM BLDV-SCNC: 3.4 MMOL/L — SIGNIFICANT CHANGE UP (ref 3.4–4.5)
POTASSIUM BLDV-SCNC: 3.7 MMOL/L — SIGNIFICANT CHANGE UP (ref 3.4–4.5)
POTASSIUM BLDV-SCNC: 4.3 MMOL/L — SIGNIFICANT CHANGE UP (ref 3.4–4.5)
POTASSIUM SERPL-MCNC: 4 MMOL/L — SIGNIFICANT CHANGE UP (ref 3.5–5.3)
POTASSIUM SERPL-MCNC: 4.5 MMOL/L — SIGNIFICANT CHANGE UP (ref 3.5–5.3)
POTASSIUM SERPL-SCNC: 4 MMOL/L — SIGNIFICANT CHANGE UP (ref 3.5–5.3)
POTASSIUM SERPL-SCNC: 4.5 MMOL/L — SIGNIFICANT CHANGE UP (ref 3.5–5.3)
PROT SERPL-MCNC: 8 G/DL — SIGNIFICANT CHANGE UP (ref 6–8.3)
PROTHROM AB SERPL-ACNC: 11.1 SEC — SIGNIFICANT CHANGE UP (ref 9.8–13.1)
RBC # BLD: 4.04 M/UL — SIGNIFICANT CHANGE UP (ref 3.8–5.2)
RBC # FLD: 15.9 % — HIGH (ref 10.3–14.5)
RSV RNA SPEC QL NAA+PROBE: NOT DETECTED — SIGNIFICANT CHANGE UP
RV+EV RNA SPEC QL NAA+PROBE: NOT DETECTED — SIGNIFICANT CHANGE UP
SAO2 % BLDV: 33.3 % — LOW (ref 60–85)
SAO2 % BLDV: 35.7 % — LOW (ref 60–85)
SAO2 % BLDV: 73.1 % — SIGNIFICANT CHANGE UP (ref 60–85)
SODIUM SERPL-SCNC: 140 MMOL/L — SIGNIFICANT CHANGE UP (ref 135–145)
SODIUM SERPL-SCNC: 141 MMOL/L — SIGNIFICANT CHANGE UP (ref 135–145)
WBC # BLD: 7.38 K/UL — SIGNIFICANT CHANGE UP (ref 3.8–10.5)
WBC # FLD AUTO: 7.38 K/UL — SIGNIFICANT CHANGE UP (ref 3.8–10.5)

## 2019-02-11 PROCEDURE — 99283 EMERGENCY DEPT VISIT LOW MDM: CPT | Mod: GC,25

## 2019-02-11 PROCEDURE — 71275 CT ANGIOGRAPHY CHEST: CPT | Mod: 26

## 2019-02-11 PROCEDURE — 71046 X-RAY EXAM CHEST 2 VIEWS: CPT | Mod: 26

## 2019-02-11 RX ORDER — SODIUM CHLORIDE 9 MG/ML
1000 INJECTION INTRAMUSCULAR; INTRAVENOUS; SUBCUTANEOUS ONCE
Qty: 0 | Refills: 0 | Status: COMPLETED | OUTPATIENT
Start: 2019-02-11 | End: 2019-02-11

## 2019-02-11 RX ORDER — IPRATROPIUM/ALBUTEROL SULFATE 18-103MCG
3 AEROSOL WITH ADAPTER (GRAM) INHALATION ONCE
Qty: 0 | Refills: 0 | Status: COMPLETED | OUTPATIENT
Start: 2019-02-11 | End: 2019-02-11

## 2019-02-11 RX ORDER — SODIUM CHLORIDE 9 MG/ML
500 INJECTION, SOLUTION INTRAVENOUS ONCE
Qty: 0 | Refills: 0 | Status: COMPLETED | OUTPATIENT
Start: 2019-02-11 | End: 2019-02-11

## 2019-02-11 RX ORDER — SODIUM CHLORIDE 9 MG/ML
500 INJECTION INTRAMUSCULAR; INTRAVENOUS; SUBCUTANEOUS ONCE
Qty: 0 | Refills: 0 | Status: COMPLETED | OUTPATIENT
Start: 2019-02-11 | End: 2019-02-11

## 2019-02-11 RX ADMIN — Medication 3 MILLILITER(S): at 08:01

## 2019-02-11 RX ADMIN — SODIUM CHLORIDE 500 MILLILITER(S): 9 INJECTION INTRAMUSCULAR; INTRAVENOUS; SUBCUTANEOUS at 07:48

## 2019-02-11 RX ADMIN — SODIUM CHLORIDE 1000 MILLILITER(S): 9 INJECTION INTRAMUSCULAR; INTRAVENOUS; SUBCUTANEOUS at 09:15

## 2019-02-11 RX ADMIN — Medication 3 MILLILITER(S): at 07:49

## 2019-02-11 RX ADMIN — SODIUM CHLORIDE 500 MILLILITER(S): 9 INJECTION, SOLUTION INTRAVENOUS at 11:54

## 2019-02-11 NOTE — ED PROVIDER NOTE - OBJECTIVE STATEMENT
89F hx CAD, mets breast/lung CA s/p mastectomy last chemo approx 4-5 months ago, p/w hemoptysis. Daughter at bedside reports that patient had cough with dark blood tinged sputum yesterday and today with associated sob. Denies fevers/chills, cp, vomiting, dizziness, weakness. No history of clots, on 325 ASA qd, no leg swelling, no recent hospitalizations.

## 2019-02-11 NOTE — ED PROVIDER NOTE - MEDICAL DECISION MAKING DETAILS
89F hx cad, breast, lung ca p/w hemoptysis. Likely progression of CA vs infectious. Will r/o PE, labs, imaging, fluids, nebs, likely a/m.

## 2019-02-11 NOTE — ED ADULT NURSE NOTE - OBJECTIVE STATEMENT
Pt presents to rm 25, A&Ox4, ambulatory at baseline w/ walker, pmhx of HTN, breast and lung CA- s/p left sided lumpectomy, as per family pt is no longer on chemo- last chemo "about a mo. ago," here for evaluation of coughing blood yesterday and this morning. Denies chest pain, shortness of breath, palpitations, diaphoresis, headaches, fevers, dizziness, nausea, vomiting, diarrhea, or urinary symptoms at this time. Rectally afebrile, skin is intact- nonblanchable redness observed to sacral area. IV established in right forearm with a 20G, labs drawn and sent, call bell in reach, warm blanket provided, bed in lowest position, side rails up x2. Will continue to monitor.

## 2019-02-11 NOTE — ED PROVIDER NOTE - ENMT, MLM
Airway patent, Nasal mucosa clear. Mouth with normal mucosa. Throat has no vesicles, no oropharyngeal exudates and uvula is midline. adentulous. no obvious blood in oropharynx

## 2019-02-11 NOTE — ED PROVIDER NOTE - NSFOLLOWUPINSTRUCTIONS_ED_ALL_ED_FT
Please follow up with your oncologist for further evaluation and treatment. Please return to the ER if you have difficulty breathing, worsening bleeding, weakness, dizziness, or have any other concerns.

## 2019-02-11 NOTE — ED PROVIDER NOTE - ATTENDING CONTRIBUTION TO CARE
HPI: 89F hx CAD, mets breast/lung CA s/p mastectomy last chemo approx 4-5 months ago, p/w hemoptysis. Daughter at bedside reports that patient had cough with dark blood tinged sputum yesterday and today with associated sob. Denies fevers/chills, cp, vomiting, dizziness, weakness. No history of clots, on 325 ASA qd, no leg swelling, no recent hospitalizations.  EXAM: Cachetic, NAD, heart RRR, lungs diffuse rhonci with wheezing. Abd soft nontender, no pitting edema.   MDM: concern of hemoptysis most likely secondary to known Lung CA vs breast CA. Possible secondary to bronchitis as pt has had cough. Will obtain labs, imaging, and provide meds and reassess. Pt stable at this time. no hemorrhage.

## 2019-02-11 NOTE — ED ADULT NURSE NOTE - INTERVENTIONS DEFINITIONS
Non-slip footwear when patient is off stretcher/Provide visual cue, wrist band, yellow gown, etc./Physically safe environment: no spills, clutter or unnecessary equipment/Call bell, personal items and telephone within reach/Stretcher in lowest position, wheels locked, appropriate side rails in place

## 2019-02-11 NOTE — ED ADULT TRIAGE NOTE - CHIEF COMPLAINT QUOTE
Patient from home c/o spitting up blood today. Patient denies any pain currently, denies any difficulty breathing. Hx. Lung Ca, breast ca, last chemo 3 weeks ago.

## 2019-02-11 NOTE — ED ADULT NURSE REASSESSMENT NOTE - NS ED NURSE REASSESS COMMENT FT1
vss, nad. pt return from xray and Ct scan. pt on HM, sinus tach noted. family at bedside, fluids infusing as per MDs orders. will cont to monitor.

## 2019-02-11 NOTE — ED PROVIDER NOTE - PROGRESS NOTE DETAILS
Repeat blood gas has improved pH. 2nd lab draw likely in error off line prior to adequate flush. Patient is afebrile, normotensive. non toxic appearing. VSS. Discussed results with patient and family - Will d/c home with return precautions and onc f/u. AJM: pt received in signout. feeling improved. rvp neg. no pna. + worsening cancer. family made aware. rpt vbg likley contaminant. 3rd vbg shows improvement in LA, no signs of sepsis. stable to dc home. All results discussed with the patient, and a copy of results has been provided. Patient is comfortable with dc plan for home. Opportunity for questions was provided and all questions have been addressed.

## 2019-02-15 DIAGNOSIS — Z71.89 OTHER SPECIFIED COUNSELING: ICD-10-CM

## 2019-08-31 NOTE — PROGRESS NOTE ADULT - PROBLEM SELECTOR PLAN 7
no
- Clinically stable  - Outpatient follow up

## 2021-12-07 NOTE — PATIENT PROFILE ADULT. - NS PRO PT RIGHT SUPPORT PERSON
Anesthesia Evaluation                  Airway   Mallampati: III  TM distance: >3 FB  Neck ROM: limited  Difficult intubation highly probable  Dental - normal exam     Pulmonary     breath sounds clear to auscultation  (+) asthma,shortness of breath,   (-) decreased breath sounds, wheezes  Cardiovascular   Exercise tolerance: poor (<4 METS)    Rhythm: regular  Rate: normal    (+) pacemaker ICD interrogated unknown, hypertension, dysrhythmias Tachycardia, CHF (Non-ischemic cardiomyopathy EF 15%) , SHER, murmur,       Neuro/Psych  (+) psychiatric history Depression,     GI/Hepatic/Renal/Endo    (+)   liver disease fatty liver disease history of elevated LFT, diabetes mellitus,     Musculoskeletal     (+) myalgias, neck pain, neck stiffness, radiculopathy  Abdominal  - normal exam   Substance History      OB/GYN          Other   arthritis, blood dyscrasia anemia,                     Anesthesia Plan    ASA 4     general   (I discussed with the patient the relevant risks of general anesthesia including, but not limited to, nausea, vomiting, disorientation, post-op delirium, nerve injury, oral/dental injury, awareness, stroke, and death.  I also reviewed any clinically relevant lab and imaging results.    A-line)  intravenous induction     Anesthetic plan, all risks, benefits, and alternatives have been provided, discussed and informed consent has been obtained with: patient.    Plan discussed with CRNA.      
same name as above

## 2023-04-10 NOTE — H&P ADULT - LYMPHATIC
Stable
posterior cervical L/anterior cervical L/supraclavicular L/posterior cervical R/supraclavicular R/anterior cervical R

## 2023-11-08 NOTE — PROGRESS NOTE ADULT - PROBLEM SELECTOR PROBLEM 8
yes
Prophylactic measure